# Patient Record
Sex: FEMALE | Race: WHITE | Employment: OTHER | URBAN - METROPOLITAN AREA
[De-identification: names, ages, dates, MRNs, and addresses within clinical notes are randomized per-mention and may not be internally consistent; named-entity substitution may affect disease eponyms.]

---

## 2017-07-19 ENCOUNTER — HOSPITAL ENCOUNTER (EMERGENCY)
Age: 27
Discharge: HOME | End: 2017-07-19
Attending: EMERGENCY MEDICINE
Payer: COMMERCIAL

## 2017-07-19 VITALS
SYSTOLIC BLOOD PRESSURE: 124 MMHG | BODY MASS INDEX: 19.76 KG/M2 | WEIGHT: 125.88 LBS | HEIGHT: 67 IN | DIASTOLIC BLOOD PRESSURE: 77 MMHG | HEART RATE: 66 BPM | TEMPERATURE: 97.7 F | OXYGEN SATURATION: 100 % | RESPIRATION RATE: 16 BRPM

## 2017-07-19 DIAGNOSIS — M43.6 TORTICOLLIS, ACUTE: Primary | ICD-10-CM

## 2017-07-19 LAB — HCG UR QL: NEGATIVE

## 2017-07-19 PROCEDURE — 96375 TX/PRO/DX INJ NEW DRUG ADDON: CPT

## 2017-07-19 PROCEDURE — 81025 URINE PREGNANCY TEST: CPT

## 2017-07-19 PROCEDURE — 96374 THER/PROPH/DIAG INJ IV PUSH: CPT

## 2017-07-19 PROCEDURE — 99283 EMERGENCY DEPT VISIT LOW MDM: CPT

## 2017-07-19 PROCEDURE — 74011250636 HC RX REV CODE- 250/636: Performed by: EMERGENCY MEDICINE

## 2017-07-19 RX ORDER — DIAZEPAM 10 MG/2ML
5 INJECTION INTRAMUSCULAR
Status: COMPLETED | OUTPATIENT
Start: 2017-07-19 | End: 2017-07-19

## 2017-07-19 RX ORDER — IBUPROFEN 600 MG/1
600 TABLET ORAL
Qty: 20 TAB | Refills: 0 | Status: SHIPPED | OUTPATIENT
Start: 2017-07-19 | End: 2020-02-24

## 2017-07-19 RX ORDER — KETOROLAC TROMETHAMINE 30 MG/ML
30 INJECTION, SOLUTION INTRAMUSCULAR; INTRAVENOUS
Status: COMPLETED | OUTPATIENT
Start: 2017-07-19 | End: 2017-07-19

## 2017-07-19 RX ORDER — METHOCARBAMOL 750 MG/1
750 TABLET, FILM COATED ORAL 4 TIMES DAILY
Qty: 30 TAB | Refills: 0 | Status: SHIPPED | OUTPATIENT
Start: 2017-07-19 | End: 2020-02-24

## 2017-07-19 RX ADMIN — KETOROLAC TROMETHAMINE 30 MG: 30 INJECTION, SOLUTION INTRAMUSCULAR at 11:36

## 2017-07-19 RX ADMIN — DIAZEPAM 2.5 MG: 5 INJECTION, SOLUTION INTRAMUSCULAR; INTRAVENOUS at 11:36

## 2017-07-19 NOTE — ED NOTES
Patient c/o left sided neck pain after turning her head while in her bed. She stated she felt fine yesterday. H/o thoracic disc herniation, and TMJ.

## 2017-07-19 NOTE — ED PROVIDER NOTES
HPI Comments: 32 y.o. 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 Pt reports she was getting out of bed this morning when she turned her head and had sudden onset left sided neck pain. Pt states she felt fine last night. Pt states she has hx of a herniated disc \"between her shoulder blades\" and TMJ. There are no other acute medical complaints voiced at this time. PCP: Zane Martinez MD    Note written by Hayes Artis, as dictated by Stella Mayer MD 11:19 AM     The history is provided by the patient. Past Medical History:   Diagnosis Date    Herniation of intervertebral disc of thoracolumbar region     TMJ condylar resorption        Past Surgical History:   Procedure Laterality Date    HX APPENDECTOMY      HX TONSIL AND ADENOIDECTOMY      HX WISDOM TEETH EXTRACTION           History reviewed. No pertinent family history. Social History     Social History    Marital status: SINGLE     Spouse name: N/A    Number of children: N/A    Years of education: N/A     Occupational History    Not on file. Social History Main Topics    Smoking status: Never Smoker    Smokeless tobacco: Never Used    Alcohol use No    Drug use: Not on file    Sexual activity: Not on file     Other Topics Concern    Not on file     Social History Narrative         ALLERGIES: Codeine    Review of Systems   Constitutional: Negative for chills and fever. HENT: Negative for ear pain and sore throat. Eyes: Negative for photophobia and pain. Respiratory: Negative for chest tightness and shortness of breath. Cardiovascular: Negative for chest pain and leg swelling. Gastrointestinal: Negative for abdominal pain, nausea and vomiting. Genitourinary: Negative for dysuria and flank pain. Musculoskeletal: Positive for neck pain (left side). Negative for back pain. Skin: Negative for rash and wound. Neurological: Negative for dizziness, light-headedness and headaches. All other systems reviewed and are negative. Vitals:    07/19/17 1104   BP: 129/82   Pulse: 66   Resp: 16   Temp: 97.7 °F (36.5 °C)   SpO2: 100%   Weight: 57.1 kg (125 lb 14.1 oz)   Height: 5' 7\" (1.702 m)            Physical Exam   Constitutional: She is oriented to person, place, and time. She appears well-developed and well-nourished. She appears distressed. HENT:   Head: Normocephalic and atraumatic. Eyes: Conjunctivae and EOM are normal.   Neck: Muscular tenderness present. Decreased range of motion present. Cardiovascular: Normal rate, regular rhythm and intact distal pulses. Pulmonary/Chest: Effort normal. No stridor. No respiratory distress. Musculoskeletal: She exhibits no edema or tenderness. Neurological: She is alert and oriented to person, place, and time. Skin: She is not diaphoretic. Psychiatric: She has a normal mood and affect. Nursing note and vitals reviewed. MDM  Number of Diagnoses or Management Options  Torticollis, acute:   Diagnosis management comments: Muscle spasm on left side of neck c/w acute torticollis - no trauma reported  No neuro deficits, give meds for symptoms and refer to her PCP for PT referral if she does not improve.     Patient Progress  Patient progress: improved    ED Course       Procedures

## 2017-07-19 NOTE — DISCHARGE INSTRUCTIONS
Torticollis: Care Instructions  Your Care Instructions  Torticollis is a severe tightness of the muscles on one side of the neck. The tight muscles can make the head turn to one side, lean to one side, or be pulled forward or backward. It is also called wryneck. Your doctor asked questions about your health and examined you. You may also have had X-rays or other tests. If your doctor thinks another medical problem is causing your tight neck muscles, you may need more tests. Torticollis usually gets better with home care. Your doctor may have you take medicine to relieve pain or relax your muscles. He or she may suggest exercise and physical therapy to help increase flexibility and relieve stress. Your doctor may also have you wear a special collar, called a cervical collar, for a day or two. The collar may help make your neck more comfortable. Follow-up care is a key part of your treatment and safety. Be sure to make and go to all appointments, and call your doctor if you are having problems. It's also a good idea to know your test results and keep a list of the medicines you take. How can you care for yourself at home? · Be safe with medicines. Read and follow all instructions on the label. ¨ If the doctor gave you a prescription medicine for pain, take it as prescribed. ¨ If you are not taking a prescription pain medicine, ask your doctor if you can take an over-the-counter medicine. · Try using a heating pad on a low or medium setting for 15 to 20 minutes every 2 or 3 hours. Try a warm shower in place of one session with the heating pad. · Try using an ice pack for 10 to 15 minutes every 2 to 3 hours. Put a thin cloth between the ice and your skin. · If your doctor recommends a cervical collar, wear it exactly as directed. When should you call for help? Call your doctor now or seek immediate medical care if:  · You have new or worse numbness in your arms, buttocks, or legs.   · You have new or worse weakness in your arms or legs. · Your neck pain gets worse. · You lose bladder or bowel control. Watch closely for changes in your health, and be sure to contact your doctor if:  · You do not get better as expected. Where can you learn more? Go to http://sherlyn-lei.info/. Enter B397 in the search box to learn more about \"Torticollis: Care Instructions. \"  Current as of: March 21, 2017  Content Version: 11.3  © 8255-6245 [employer]. Care instructions adapted under license by [employer] (which disclaims liability or warranty for this information). If you have questions about a medical condition or this instruction, always ask your healthcare professional. [address] any warranty or liability for your use of this information. We hope that we have addressed all of your medical concerns. The examination and treatment you received in the Emergency Department were for an emergent problem and were not intended as complete care. It is important that you follow up with your healthcare provider(s) for ongoing care. If your symptoms worsen or do not improve as expected, and you are unable to reach your usual health care provider(s), you should return to the Emergency Department. Today's healthcare is undergoing tremendous change, and patient satisfaction surveys are one of the many tools to assess the quality of medical care. You may receive a survey from the [employer] regarding your experience in the Emergency Department. I hope that your experience has been completely positive, particularly the medical care that I provided. As such, please participate in the survey; anything less than excellent does not meet my expectations or intentions. [facility] and [facility] participate in nationally recognized quality of care measures.   If your blood pressure is greater than 120/80, as reported below, we urge that you seek medical care to address the potential of high blood pressure, commonly known as hypertension. Hypertension can be hereditary or can be caused by certain medical conditions, pain, stress, or \"white coat syndrome. \"       Please make an appointment with your health care provider(s) for follow up of your Emergency Department visit. VITALS:   Patient Vitals for the past 8 hrs:   Temp Pulse Resp BP SpO2   07/19/17 1104 97.7 °F (36.5 °C) 66 16 129/82 100 %          Thank you for allowing us to provide you with medical care today. We realize that you have many choices for your emergency care needs. Please choose us in the future for any continued health care needs. Melani Dixon, [address] [place] [address].   Office: [phone]            Recent Results (from the past 24 hour(s))   HCG URINE, QL. - POC    Collection Time: 07/19/17 11:36 AM   Result Value Ref Range    Pregnancy test,urine (POC) NEGATIVE  NEG

## 2017-07-19 NOTE — ED NOTES
While administering Diazepam patient began to feel dizzy like she was going to pass out, 2.5mg IV of diazepam administered only.

## 2019-11-29 NOTE — PATIENT INSTRUCTIONS
Weeks 10 to 14 of Your Pregnancy: Care Instructions  Your Care Instructions    By weeks 10 to 14 of your pregnancy, the placenta has formed inside your uterus. It is possible to hear your baby's heartbeat with a special ultrasound device. Your baby's eyes can and do move. The arms and legs can bend. This is a good time to think about testing for birth defects. There are two types of tests: screening and diagnostic. Screening tests show the chance that a baby has a certain birth defect. They can't tell you for sure that your baby has a problem. Diagnostic tests show if a baby has a certain birth defect. It's your choice whether to have these tests. You and your partner can talk to your doctor or midwife about birth defects tests. Follow-up care is a key part of your treatment and safety. Be sure to make and go to all appointments, and call your doctor if you are having problems. It's also a good idea to know your test results and keep a list of the medicines you take. How can you care for yourself at home? Decide about tests  · You can have screening tests and diagnostic tests to check for birth defects. The decision to have a test for birth defects is personal. Think about your age, your chance of passing on a family disease, your need to know about any problems, and what you might do after you have the test results. ? Triple or quadruple (quad) blood tests. These screening tests can be done between 15 and 20 weeks of pregnancy. They check the amounts of three or four substances in your blood. The doctor looks at these test results, along with your age and other factors, to find out the chance that your baby may have certain problems. ? Amniocentesis. This diagnostic test is used to look for chromosomal problems in the baby's cells.  It can be done between 15 and 20 weeks of pregnancy, usually around week 16.  ? Nuchal translucency test. This test uses ultrasound to measure the thickness of the area at the back of the baby's neck. An increase in the thickness can be an early sign of Down syndrome. ? Chorionic villus sampling (CVS). This is a test that looks for certain genetic problems with your baby. The same genes that are in your baby are in the placenta. A small piece of the placenta is taken out and tested. This test is done when you are 10 to 13 weeks pregnant. Ease discomfort  · Slow down and take naps when you feel tired. · If your emotions swing, talk to someone. Crying, anxiety, and concentration problems are common. · If your gums bleed, try a softer toothbrush. If your gums are puffy and bleed a lot, see your dentist.  · If you feel dizzy:  ? Get up slowly after sitting or lying down. ? Drink plenty of fluids. ? Eat small snacks to keep your blood sugar stable. ? Put your head between your legs as though you were tying your shoelaces. ? Lie down with your legs higher than your head. Use pillows to prop up your feet. · If you have a headache:  ? Lie down. ? Ask your partner or a good friend for a neck massage. ? Try cool cloths over your forehead or across the back of your neck. ? Use acetaminophen (Tylenol) for pain relief. Do not use nonsteroidal anti-inflammatory drugs (NSAIDs), such as ibuprofen (Advil, Motrin) or naproxen (Aleve), unless your doctor says it is okay. · If you have a nosebleed, pinch your nose gently, and hold it for a short while. To prevent nosebleeds, try massaging a small dab of petroleum jelly, such as Vaseline, in your nostrils. · If your nose is stuffed up, try saline (saltwater) nose sprays. Do not use decongestant sprays. Care for your breasts  · Wear a bra that gives you good support. · Know that changes in your breasts are normal.  ? Your breasts may get larger and more tender. Tenderness usually gets better by 12 weeks. ? Your nipples may get darker and larger, and small bumps around your nipples may show more. ?  The veins in your chest and breasts may show more. · Don't worry about \"toughening'\" your nipples. Breastfeeding will naturally do this. Where can you learn more? Go to http://sherlyn-lei.info/. Enter D440 in the search box to learn more about \"Weeks 10 to 14 of Your Pregnancy: Care Instructions. \"  Current as of: May 29, 2019  Content Version: 12.2  © 2463-2236 [employer]. Care instructions adapted under license by [employer] (which disclaims liability or warranty for this information). If you have questions about a medical condition or this instruction, always ask your healthcare professional. [address] any warranty or liability for your use of this information.

## 2019-12-02 ENCOUNTER — OFFICE VISIT (OUTPATIENT)
Dept: OBGYN CLINIC | Age: 29
End: 2019-12-02

## 2019-12-02 VITALS — DIASTOLIC BLOOD PRESSURE: 82 MMHG | WEIGHT: 130 LBS | SYSTOLIC BLOOD PRESSURE: 120 MMHG

## 2019-12-02 DIAGNOSIS — O16.9 ESSENTIAL HYPERTENSION AFFECTING PREGNANCY, ANTEPARTUM: ICD-10-CM

## 2019-12-02 DIAGNOSIS — Z23 ENCOUNTER FOR IMMUNIZATION: ICD-10-CM

## 2019-12-02 DIAGNOSIS — Z34.80 SUPERVISION OF OTHER NORMAL PREGNANCY, ANTEPARTUM: Primary | ICD-10-CM

## 2019-12-02 PROBLEM — Z34.90 PRENATAL CARE: Status: ACTIVE | Noted: 2019-12-02

## 2019-12-02 LAB
ANTIBODY SCREEN, EXTERNAL: NEGATIVE
CHLAMYDIA, EXTERNAL: NEGATIVE
HBSAG, EXTERNAL: NEGATIVE
HCT, EXTERNAL: 41.1
HGB, EXTERNAL: 14.3
HIV, EXTERNAL: NORMAL
N. GONORRHEA, EXTERNAL: NEGATIVE
NIPT, EXTERNAL: NORMAL
PAP SMEAR, EXTERNAL: NORMAL
RUBELLA, EXTERNAL: 7.68
T. PALLIDUM, EXTERNAL: NORMAL
TYPE, ABO & RH, EXTERNAL: NORMAL
URINALYSIS, EXTERNAL: NEGATIVE

## 2019-12-02 RX ORDER — HYDROCHLOROTHIAZIDE 12.5 MG/1
TABLET ORAL
Refills: 7 | COMMUNITY
Start: 2019-10-22 | End: 2020-02-24

## 2019-12-02 RX ORDER — METHYLDOPA 250 MG/1
250 TABLET, FILM COATED ORAL 3 TIMES DAILY
Qty: 90 TAB | Refills: 3 | Status: SHIPPED | OUTPATIENT
Start: 2019-12-02 | End: 2020-03-10 | Stop reason: SDUPTHER

## 2019-12-02 NOTE — LETTER
12/2/2019 10:52 AM 
 
 
Ms. Jeni Hancock [address] To Whom it may concern; Jeni Hancock is under my care. She was seen in our office today for her initial pregnancy appointment. She is hereby prohibited from traveling until after 14 weeks gestation due to the risk of miscarriage. Please contact my office should you have any questions. Sincerely, Nely Baker MD

## 2019-12-03 LAB
ALBUMIN SERPL-MCNC: 4.7 G/DL (ref 3.5–5.5)
ALBUMIN/GLOB SERPL: 2.2 {RATIO} (ref 1.2–2.2)
ALP SERPL-CCNC: 58 IU/L (ref 39–117)
ALT SERPL-CCNC: 14 IU/L (ref 0–32)
AST SERPL-CCNC: 20 IU/L (ref 0–40)
BILIRUB SERPL-MCNC: 0.3 MG/DL (ref 0–1.2)
BUN SERPL-MCNC: 7 MG/DL (ref 6–20)
BUN/CREAT SERPL: 12 (ref 9–23)
CALCIUM SERPL-MCNC: 9.7 MG/DL (ref 8.7–10.2)
CHLORIDE SERPL-SCNC: 97 MMOL/L (ref 96–106)
CO2 SERPL-SCNC: 22 MMOL/L (ref 20–29)
CREAT SERPL-MCNC: 0.6 MG/DL (ref 0.57–1)
GLOBULIN SER CALC-MCNC: 2.1 G/DL (ref 1.5–4.5)
GLUCOSE SERPL-MCNC: 80 MG/DL (ref 65–99)
POTASSIUM SERPL-SCNC: 3.8 MMOL/L (ref 3.5–5.2)
PROT SERPL-MCNC: 6.8 G/DL (ref 6–8.5)
SODIUM SERPL-SCNC: 137 MMOL/L (ref 134–144)

## 2019-12-03 NOTE — PROGRESS NOTES
Normal, reviewed  Add PIH labs to PL (hgb,plat,cr, ast, alt, ur: prot 24 hr total or ratio) w date  Can review w pt at [employer]

## 2019-12-04 LAB
ABO GROUP BLD: NORMAL
BACTERIA UR CULT: NO GROWTH
BACTERIA UR CULT: NORMAL
BLD GP AB SCN SERPL QL: NEGATIVE
ERYTHROCYTE [DISTWIDTH] IN BLOOD BY AUTOMATED COUNT: 12.6 % (ref 12.3–15.4)
HBV SURFACE AG SERPL QL IA: NEGATIVE
HCT VFR BLD AUTO: 41.1 % (ref 34–46.6)
HGB BLD-MCNC: 14.3 G/DL (ref 11.1–15.9)
HIV 1+2 AB+HIV1 P24 AG SERPL QL IA: NON REACTIVE
MCH RBC QN AUTO: 30.5 PG (ref 26.6–33)
MCHC RBC AUTO-ENTMCNC: 34.8 G/DL (ref 31.5–35.7)
MCV RBC AUTO: 88 FL (ref 79–97)
MORPHOLOGY BLD-IMP: NORMAL
PLATELET # BLD AUTO: NORMAL X10E3/UL
RBC # BLD AUTO: 4.69 X10E6/UL (ref 3.77–5.28)
RH BLD: POSITIVE
RUBV IGG SERPL IA-ACNC: 7.68 INDEX
TREPONEMA PALLIDUM IGG+IGM AB [PRESENCE] IN SERUM OR PLASMA BY IMMUNOASSAY: NON REACTIVE
WBC # BLD AUTO: 9.2 X10E3/UL (ref 3.4–10.8)

## 2019-12-05 LAB
C TRACH RRNA CVX QL NAA+PROBE: NEGATIVE
CYTOLOGIST CVX/VAG CYTO: NORMAL
CYTOLOGY CVX/VAG DOC CYTO: NORMAL
CYTOLOGY CVX/VAG DOC THIN PREP: NORMAL
CYTOLOGY HISTORY:: NORMAL
DX ICD CODE: NORMAL
HPV I/H RISK 4 DNA CVX QL PROBE+SIG AMP: NEGATIVE
Lab: NORMAL
N GONORRHOEA RRNA CVX QL NAA+PROBE: NEGATIVE
OTHER STN SPEC: NORMAL
STAT OF ADQ CVX/VAG CYTO-IMP: NORMAL

## 2019-12-09 LAB — CYSTIC FIBROSIS, EXTERNAL: NEGATIVE

## 2019-12-10 ENCOUNTER — TELEPHONE (OUTPATIENT)
Dept: OBGYN CLINIC | Age: 29
End: 2019-12-10

## 2019-12-10 NOTE — TELEPHONE ENCOUNTER
Call received 540am    30year old patient last seen in the office 12/2/19    Patient denies vaginal bleeding and cramping. Patient calling with questions. 1) patient wondering about getting the d-tap . Patient advised that she will get it at 28 week chad.    2) patient wondering about stopping the       hydroCHLOROthiazide (HYDRODIURIL) 12.5 mg tablet    Or taking it with the methyldopa (ALDOMET) 250 mg tablet     This nurse advised per your notes that she is to change to taking the aldomet and stop the hydrodiuril  Please confirm    3) Patient states she is scheduled for jury duty every day for 6-8 hours starting on 12/20 -1/3/2020    Patient is wondering if she might have a letter to excuse her due to the pregnancy, feeling tired, fatigue, using her inhaler      Please advised regarding questions 2 and 3

## 2019-12-10 NOTE — TELEPHONE ENCOUNTER
1: rec tdap in third trimester, but family members who will be around baby can get it at any time    2: we discussed stopping hctz at EOB visit, yes stop it  I sent aldomet tid to replace it, discussed it with her at visit, yes start it    3: I am not sure why she can't perform jury duty while pregnant (unless she is near her due date or on bed rest/limited activity). Those would be things I could write a letter for. .  If there is an inhaler/medication issue, that may need to be addressed by PCP, if she can't have access to inhaler in court. How often is he using her inhaler? If it is every day/more frequently, then I recommend seeing PCP, to see if she needs maintenance medication /rather than a rescue inhaler. It is important to keep asthma under control in pregnancy.

## 2019-12-11 NOTE — TELEPHONE ENCOUNTER
Patient advised of MD recommendations and verbalized understanding. See result notes. Patient advised of MD reviewed labs and recommendations. Patient verbalized understanding.

## 2019-12-11 NOTE — PROGRESS NOTES
Normal results, add to prenatal records and PL  Notify pt  Low risk NIPS screening  Xx if wants to know  Confirm has 20 wk us scheduled.

## 2019-12-11 NOTE — PROGRESS NOTES
Patient advised of MD reviewed labs and recommendations and verbalized understanding. Patient was given the gender as requested.

## 2019-12-13 NOTE — PROGRESS NOTES
After obtaining consent, and per orders of Dr. Lauryn Crain, injection of the Influenza Vaccine given in left deltoid by Minoo Sullivan LPN. Patient instructed to remain in clinic for 20 minutes afterwards, and to report any adverse reaction to me immediately.

## 2019-12-30 ENCOUNTER — ROUTINE PRENATAL (OUTPATIENT)
Dept: OBGYN CLINIC | Age: 29
End: 2019-12-30

## 2019-12-30 VITALS
DIASTOLIC BLOOD PRESSURE: 82 MMHG | BODY MASS INDEX: 21.03 KG/M2 | WEIGHT: 134 LBS | HEIGHT: 67 IN | SYSTOLIC BLOOD PRESSURE: 122 MMHG

## 2019-12-30 DIAGNOSIS — Z3A.14 14 WEEKS GESTATION OF PREGNANCY: ICD-10-CM

## 2019-12-30 DIAGNOSIS — Z34.90 PRENATAL CARE, ANTEPARTUM: ICD-10-CM

## 2019-12-30 DIAGNOSIS — O16.9 ESSENTIAL HYPERTENSION AFFECTING PREGNANCY, ANTEPARTUM: Primary | ICD-10-CM

## 2019-12-30 NOTE — PROGRESS NOTES
[facility]  [email or web address]  [phone]    Dudley Reddy MD, FACOG     Follow-up OB visit    Chief Complaint   Patient presents with    Routine Prenatal Visit       Vitals:    19 1007   BP: 122/82   Weight: 134 lb (60.8 kg)   Height: 5' 7\" (1.702 m)       Patient Active Problem List    Diagnosis Date Noted    Prenatal care 2019        The patient reports the following concerns: none    Prenatal Visit    Vitals:    19 1007   BP: 122/82   Weight: 134 lb (60.8 kg)   Height: 5' 7\" (1.702 m)     See PN flowsheet for exam      30 y.o.  14w1d   Encounter Diagnosis   Name Primary?     Essential hypertension affecting pregnancy, antepartum Yes        [] SAB/bleeding precautions reviewed   [] PTL/PPROM precautions reviewed   [] Labor precautions reviewed   [] Fetal kick counts discussed   [] Labs reviewed with patient   [] Boston Garcia precautions reviewed   [] Consent reviewed   [] Handouts given to pt   [] Glucola handout    [] GBS/labor/Magic Hour handout   []    [] We reviewed CDC recommendations for Tdap for patient and close contacts and RBA of receiving in pregnancy, advised obtaining in third trimester   [] Reviewed healthy nutrition in pregnancy and good exercise practices   [] We disc safer medications in pregnancy and referred patient to [facility] NUBIA resources   [] We reviewed CDC recommendations for flu vaccine and RBA of receiving in pregnancy   []    []    []           Orders Placed This Encounter   Mckay Davila MD

## 2019-12-30 NOTE — PATIENT INSTRUCTIONS

## 2019-12-31 LAB
CREAT 24H UR-MRATE: 955 MG/24 HR (ref 800–1800)
CREAT UR-MCNC: 34.1 MG/DL
PROT 24H UR-MRATE: 168 MG/24 HR (ref 30–150)
PROT UR-MCNC: 6 MG/DL

## 2020-01-24 NOTE — PATIENT INSTRUCTIONS

## 2020-01-27 ENCOUNTER — ROUTINE PRENATAL (OUTPATIENT)
Dept: OBGYN CLINIC | Age: 30
End: 2020-01-27

## 2020-01-27 VITALS
WEIGHT: 137 LBS | DIASTOLIC BLOOD PRESSURE: 62 MMHG | HEIGHT: 67 IN | BODY MASS INDEX: 21.5 KG/M2 | SYSTOLIC BLOOD PRESSURE: 118 MMHG

## 2020-01-27 DIAGNOSIS — Z34.90 PRENATAL CARE, ANTEPARTUM: Primary | ICD-10-CM

## 2020-01-27 DIAGNOSIS — Z3A.18 18 WEEKS GESTATION OF PREGNANCY: ICD-10-CM

## 2020-01-27 DIAGNOSIS — O10.919 ESSENTIAL HYPERTENSION IN PREGNANCY: ICD-10-CM

## 2020-01-27 LAB — AFP, MATERNAL, EXTERNAL: NORMAL

## 2020-01-27 NOTE — PROGRESS NOTES
[facility]  [email or web address]  [phone]    Devonte Anthony MD, FACOG     Follow-up OB visit    Chief Complaint   Patient presents with    Routine Prenatal Visit       Vitals:    20 0932   BP: 118/62   Weight: 137 lb (62.1 kg)   Height: 5' 7\" (1.702 m)       Patient Active Problem List    Diagnosis Date Noted    Essential hypertension in pregnancy 2020    Prenatal care 2019        The patient reports the following concerns: none, MSAFP today    Prenatal Visit    Vitals:    20 0932   BP: 118/62   Weight: 137 lb (62.1 kg)   Height: 5' 7\" (1.702 m)     See PN flowsheet for exam      30 y.o.  18w1d   Encounter Diagnoses   Name Primary?  Prenatal care, antepartum Yes    Essential hypertension in pregnancy     18 weeks gestation of pregnancy         [] SAB/bleeding precautions reviewed   [] PTL/PPROM precautions reviewed   [] Labor precautions reviewed   [] Fetal kick counts discussed   [] Labs reviewed with patient   [] Radha Mary precautions reviewed   [] Consent reviewed   [] Handouts given to pt   [] Glucola handout    [] GBS/labor/Magic Hour handout   []    [] We reviewed CDC recommendations for Tdap for patient and close contacts and RBA of receiving in pregnancy, advised obtaining in third trimester   [] Reviewed healthy nutrition in pregnancy and good exercise practices   [] We disc safer medications in pregnancy and referred patient to [facility]   [] We reviewed CDC recommendations for flu vaccine and RBA of receiving in pregnancy   []    []    []       Follow-up and Dispositions    · Return in about 4 weeks (around 2020) for Follow up OB visit.          Orders Placed This Encounter    AFP, MATERNAL SCREEN    CBC W/O DIFF       Devonte Anthony MD

## 2020-01-29 LAB
AFP ADJ MOM SERPL: 1.07
AFP INTERP SERPL-IMP: NORMAL
AFP INTERP SERPL-IMP: NORMAL
AFP SERPL-MCNC: 49 NG/ML
AGE AT DELIVERY: 32.3 YR
COMMENT, 018013: NORMAL
ERYTHROCYTE [DISTWIDTH] IN BLOOD BY AUTOMATED COUNT: 12.3 % (ref 11.7–15.4)
GA METHOD: NORMAL
GA: 18 WEEKS
HCT VFR BLD AUTO: 37.1 % (ref 34–46.6)
HGB BLD-MCNC: 12.5 G/DL (ref 11.1–15.9)
IDDM PATIENT QL: NO
MCH RBC QN AUTO: 30 PG (ref 26.6–33)
MCHC RBC AUTO-ENTMCNC: 33.7 G/DL (ref 31.5–35.7)
MCV RBC AUTO: 89 FL (ref 79–97)
MORPHOLOGY BLD-IMP: NORMAL
MULTIPLE PREGNANCY: NO
NEURAL TUBE DEFECT RISK FETUS: 9862 %
PLATELET # BLD AUTO: NORMAL X10E3/UL
RBC # BLD AUTO: 4.17 X10E6/UL (ref 3.77–5.28)
RESULTS, 017004: NORMAL
WBC # BLD AUTO: 10.5 X10E3/UL (ref 3.4–10.8)

## 2020-01-29 NOTE — PROGRESS NOTES
Normal results, add to prenatal records. We can review in detail with patient at next visit.   Update PNL: hgb/plat/afp

## 2020-02-10 ENCOUNTER — HOSPITAL ENCOUNTER (OUTPATIENT)
Dept: PERINATAL CARE | Age: 30
Discharge: HOME | End: 2020-02-10
Attending: OBSTETRICS & GYNECOLOGY
Payer: COMMERCIAL

## 2020-02-10 PROCEDURE — 76805 OB US >/= 14 WKS SNGL FETUS: CPT | Performed by: OBSTETRICS & GYNECOLOGY

## 2020-02-24 ENCOUNTER — ROUTINE PRENATAL (OUTPATIENT)
Dept: OBGYN CLINIC | Age: 30
End: 2020-02-24

## 2020-02-24 VITALS
DIASTOLIC BLOOD PRESSURE: 58 MMHG | HEIGHT: 67 IN | SYSTOLIC BLOOD PRESSURE: 106 MMHG | WEIGHT: 143 LBS | BODY MASS INDEX: 22.44 KG/M2

## 2020-02-24 DIAGNOSIS — Z34.00 PRENATAL CARE OF PRIMIGRAVIDA, ANTEPARTUM: ICD-10-CM

## 2020-02-24 DIAGNOSIS — O10.919 ESSENTIAL HYPERTENSION IN PREGNANCY: Primary | ICD-10-CM

## 2020-02-24 DIAGNOSIS — Z34.90 PRENATAL CARE, ANTEPARTUM: ICD-10-CM

## 2020-02-24 DIAGNOSIS — Z3A.22 22 WEEKS GESTATION OF PREGNANCY: ICD-10-CM

## 2020-02-24 NOTE — Clinical Note
Add tsh to gluocla visit, does she have MFM appt set up for 3rd tri growth for [facility]

## 2020-02-24 NOTE — PROGRESS NOTES
_ [facility]  [email or web address]  [phone]    Humble Alegria MD, FACOG     Follow-up OB visit    Chief Complaint   Patient presents with    Routine Prenatal Visit       Vitals:    20 0903   BP: 106/58   Weight: 143 lb (64.9 kg)   Height: 5' 7\" (1.702 m)       Patient Active Problem List    Diagnosis Date Noted    Essential hypertension in pregnancy 2020    Prenatal care 2019        The patient reports the following concerns: doing well, C/O of intermittent palpitations  GERD:       Prenatal Visit    Vitals:    20 0903   BP: 106/58   Weight: 143 lb (64.9 kg)   Height: 5' 7\" (1.702 m)     See PN flowsheet for exam  Chest  · Respiratory Effort: breathing normal        Auscultation: normal breath sounds, clear bilaterally    Cardiovascular  · Heart:  · Auscultation: regular rate and rhythm without murmur, normal S1, S2    Ext no c/te    26 y.o.  22w1d   Encounter Diagnoses   Name Primary?     Prenatal care of primigravida, antepartum Yes    Prenatal care, antepartum      Disc labs w glucola, and TSH; defer labs to that draw or sooner prn  Disc safer meds for GERD     [] SAB/bleeding precautions reviewed   [] PTL/PPROM precautions reviewed   [] Labor precautions reviewed   [] Fetal kick counts discussed   [] Labs reviewed with patient   [] Hilda Barges precautions reviewed   [] Consent reviewed   [] Handouts given to pt   [] Glucola handout    [] GBS/labor/Magic Hour handout   []    [] We reviewed CDC recommendations for Tdap for patient and close contacts and RBA of receiving in pregnancy, advised obtaining in third trimester   [] Reviewed healthy nutrition in pregnancy and good exercise practices   [] We disc safer medications in pregnancy and referred patient to [facility] NUBIA resources   [] We reviewed CDC recommendations for flu vaccine and RBA of receiving in pregnancy   []    []    []       Follow-up and Dispositions    · Return in about 4 weeks (around 3/23/2020) for Follow up OB visit. No orders of the defined types were placed in this encounter.       Essie Banuelos MD

## 2020-02-24 NOTE — PATIENT INSTRUCTIONS
Weeks 22 to 26 of Your Pregnancy: Care Instructions  Your Care Instructions    As you enter your 7th month of pregnancy at week 26, your baby's lungs are growing stronger and getting ready to breathe. You may notice that your baby responds to the sound of your or your partner's voice. You may also notice that your baby does less turning and twisting and more squirming or jerking. Jerking often means that your baby has the hiccups. Hiccups are perfectly normal and are only temporary. You may want to think about attending a childbirth preparation class. This is also a good time to start thinking about whether you want to have pain medicine during labor. Most pregnant women are tested for gestational diabetes between weeks 25 and 28. Gestational diabetes occurs when your blood sugar level gets too high when you're pregnant. The test is important, because you can have gestational diabetes and not know it. But the condition can cause problems for your baby. Follow-up care is a key part of your treatment and safety. Be sure to make and go to all appointments, and call your doctor if you are having problems. It's also a good idea to know your test results and keep a list of the medicines you take. How can you care for yourself at home? Ease discomfort from your baby's kicking  · Change your position. Sometimes this will cause your baby to change position too. · Take a deep breath while you raise your arm over your head. Then breathe out while you drop your arm. Do Kegel exercises to prevent urine from leaking  · You can do Kegel exercises while you stand or sit. ? Squeeze the same muscles you would use to stop your urine. Your belly and thighs should not move. ? Hold the squeeze for 3 seconds, and then relax for 3 seconds. ? Start with 3 seconds. Then add 1 second each week until you are able to squeeze for 10 seconds. ? Repeat the exercise 10 to 15 times for each session.  Do three or more sessions each day.  Ease or reduce swelling in your feet, ankles, hands, and fingers  · If your fingers are puffy, take off your rings. · Do not eat high-salt foods, such as potato chips. · Prop up your feet on a stool or couch as much as possible. Sleep with pillows under your feet. · Do not stand for long periods of time or wear tight shoes. · Wear support stockings. Where can you learn more? Go to http://sherlyn-lei.info/. Enter G264 in the search box to learn more about \"Weeks 22 to 26 of Your Pregnancy: Care Instructions. \"  Current as of: May 29, 2019  Content Version: 12.2  © 8817-9668 [employer]. Care instructions adapted under license by [employer] (which disclaims liability or warranty for this information). If you have questions about a medical condition or this instruction, always ask your healthcare professional. [address] any warranty or liability for your use of this information.

## 2020-03-10 ENCOUNTER — TELEPHONE (OUTPATIENT)
Dept: OBGYN CLINIC | Age: 30
End: 2020-03-10

## 2020-03-10 RX ORDER — METHYLDOPA 250 MG/1
250 TABLET, FILM COATED ORAL 3 TIMES DAILY
Qty: 90 TAB | Refills: 3 | Status: SHIPPED | OUTPATIENT
Start: 2020-03-10 | End: 2020-07-16

## 2020-03-10 RX ORDER — METHYLDOPA 250 MG/1
250 TABLET, FILM COATED ORAL 3 TIMES DAILY
Qty: 90 TAB | Refills: 3 | Status: SHIPPED | OUTPATIENT
Start: 2020-03-10 | End: 2020-03-10

## 2020-03-10 NOTE — TELEPHONE ENCOUNTER
This nurse called the patient back. Patient would like the prescription resent to [address] Prescription resent as per Md order to patient requested pharmacy. Patient verbalized understanding.

## 2020-03-10 NOTE — TELEPHONE ENCOUNTER
[facility]   Phone Number: [phone]             [address] [place] [address] Street, thank you. I have been going through different [facility] to refill this medication each month.  The issue has been the lack of availability of the meds. Each month, I've had to call in ahead of time to ensure that I'll at least have my next month's supply. This month has been different, as I called to refill on Saturday, March 7th and they have been telling me each day that it will be in. I was able to get two days worth of medication today and they again, told me the medication will be in tomorrow, with no guarantee. The pharmacists tell me each month that this medication has to be called in from the supplier so I wasn't sure if calling this medication to another location was an option or if Dr. Tila Leblanc had another recommendation.     Previous Messages         Please advise how to proceed

## 2020-03-10 NOTE — TELEPHONE ENCOUNTER
I am not sure about the supply, but we can send it to an alternative pharmacy if needed. Or she can do 3mo supply through mail order.

## 2020-03-24 LAB
ANNOTATION COMMENT IMP: NORMAL
ANNOTATION COMMENT IMP: NORMAL
CARRIER DETECTION RATE, 450111: NORMAL
CFTR MUT ANL BLD/T: NORMAL
CLINICAL DATA, 450005: NORMAL
ELECTRONICALLY SIGNED BY, 450014: NORMAL
ETHNIC BACKGROUND STATED: NORMAL
GEN KNWLDGE REF ID: NORMAL
GENE DIS ANL CARRIER INTERP-IMP: NORMAL
GENETIC COUNSELOR, 450001: NORMAL
REF LAB TEST METHOD: NORMAL
SMN1 GENE MUT ANL BLD/T: NORMAL
SMN1 GENE MUT ANL BLD/T: NORMAL
SPECIMEN SOURCE: NORMAL
SPECIMEN STATUS REPORT, ROLRST: NORMAL
SPECIMEN(S) RECEIVED, 450004: NORMAL
TEST PERFORMANCE INFO SPEC: NORMAL

## 2020-03-25 NOTE — PROGRESS NOTES
Normal results, add to prenatal records. We can review in detail with patient at next visit.   Update cf/sma

## 2020-04-03 ENCOUNTER — HOSPITAL ENCOUNTER (EMERGENCY)
Age: 30
Discharge: HOME | End: 2020-04-03
Attending: STUDENT IN AN ORGANIZED HEALTH CARE EDUCATION/TRAINING PROGRAM | Admitting: STUDENT IN AN ORGANIZED HEALTH CARE EDUCATION/TRAINING PROGRAM
Payer: COMMERCIAL

## 2020-04-03 VITALS
RESPIRATION RATE: 18 BRPM | WEIGHT: 143 LBS | SYSTOLIC BLOOD PRESSURE: 112 MMHG | HEART RATE: 77 BPM | TEMPERATURE: 97.5 F | OXYGEN SATURATION: 100 % | BODY MASS INDEX: 22.44 KG/M2 | DIASTOLIC BLOOD PRESSURE: 64 MMHG | HEIGHT: 67 IN

## 2020-04-03 DIAGNOSIS — R55 SYNCOPE AND COLLAPSE: Primary | ICD-10-CM

## 2020-04-03 LAB
ALBUMIN SERPL-MCNC: 2.6 G/DL (ref 3.5–5)
ALBUMIN/GLOB SERPL: 0.8 {RATIO} (ref 1.1–2.2)
ALP SERPL-CCNC: 72 U/L (ref 45–117)
ALT SERPL-CCNC: 13 U/L (ref 12–78)
ANION GAP SERPL CALC-SCNC: 7 MMOL/L (ref 5–15)
APPEARANCE UR: CLEAR
AST SERPL-CCNC: 13 U/L (ref 15–37)
BASOPHILS # BLD: 0 K/UL (ref 0–0.1)
BASOPHILS NFR BLD: 0 % (ref 0–1)
BILIRUB SERPL-MCNC: 0.3 MG/DL (ref 0.2–1)
BILIRUB UR QL: NEGATIVE
BUN SERPL-MCNC: 5 MG/DL (ref 6–20)
BUN/CREAT SERPL: 10 (ref 12–20)
CALCIUM SERPL-MCNC: 8.7 MG/DL (ref 8.5–10.1)
CHLORIDE SERPL-SCNC: 108 MMOL/L (ref 97–108)
CO2 SERPL-SCNC: 23 MMOL/L (ref 21–32)
COLOR UR: NORMAL
COMMENT, HOLDF: NORMAL
CREAT SERPL-MCNC: 0.52 MG/DL (ref 0.55–1.02)
DIFFERENTIAL METHOD BLD: ABNORMAL
EOSINOPHIL # BLD: 0.3 K/UL (ref 0–0.4)
EOSINOPHIL NFR BLD: 3 % (ref 0–7)
ERYTHROCYTE [DISTWIDTH] IN BLOOD BY AUTOMATED COUNT: 12.7 % (ref 11.5–14.5)
GLOBULIN SER CALC-MCNC: 3.4 G/DL (ref 2–4)
GLUCOSE SERPL-MCNC: 103 MG/DL (ref 65–100)
GLUCOSE UR STRIP.AUTO-MCNC: NEGATIVE MG/DL
HCT VFR BLD AUTO: 32.1 % (ref 35–47)
HGB BLD-MCNC: 11 G/DL (ref 11.5–16)
HGB UR QL STRIP: NEGATIVE
IMM GRANULOCYTES # BLD AUTO: 0.1 K/UL (ref 0–0.04)
IMM GRANULOCYTES NFR BLD AUTO: 1 % (ref 0–0.5)
KETONES UR QL STRIP.AUTO: NEGATIVE MG/DL
LEUKOCYTE ESTERASE UR QL STRIP.AUTO: NEGATIVE
LYMPHOCYTES # BLD: 1.2 K/UL (ref 0.8–3.5)
LYMPHOCYTES NFR BLD: 12 % (ref 12–49)
MCH RBC QN AUTO: 29.9 PG (ref 26–34)
MCHC RBC AUTO-ENTMCNC: 34.3 G/DL (ref 30–36.5)
MCV RBC AUTO: 87.2 FL (ref 80–99)
MONOCYTES # BLD: 0.9 K/UL (ref 0–1)
MONOCYTES NFR BLD: 9 % (ref 5–13)
NEUTS SEG # BLD: 7.5 K/UL (ref 1.8–8)
NEUTS SEG NFR BLD: 75 % (ref 32–75)
NITRITE UR QL STRIP.AUTO: NEGATIVE
NRBC # BLD: 0 K/UL (ref 0–0.01)
NRBC BLD-RTO: 0 PER 100 WBC
PH UR STRIP: 6.5 [PH] (ref 5–8)
PLATELET # BLD AUTO: ABNORMAL K/UL (ref 150–400)
POTASSIUM SERPL-SCNC: 3.4 MMOL/L (ref 3.5–5.1)
PROT SERPL-MCNC: 6 G/DL (ref 6.4–8.2)
PROT UR STRIP-MCNC: NEGATIVE MG/DL
RBC # BLD AUTO: 3.68 M/UL (ref 3.8–5.2)
RBC MORPH BLD: ABNORMAL
SAMPLES BEING HELD,HOLD: NORMAL
SODIUM SERPL-SCNC: 138 MMOL/L (ref 136–145)
SP GR UR REFRACTOMETRY: <1.005 (ref 1–1.03)
UROBILINOGEN UR QL STRIP.AUTO: 0.2 EU/DL (ref 0.2–1)
WBC # BLD AUTO: 10 K/UL (ref 3.6–11)

## 2020-04-03 PROCEDURE — 80053 COMPREHEN METABOLIC PANEL: CPT

## 2020-04-03 PROCEDURE — 99284 EMERGENCY DEPT VISIT MOD MDM: CPT

## 2020-04-03 PROCEDURE — 93005 ELECTROCARDIOGRAM TRACING: CPT

## 2020-04-03 PROCEDURE — 81003 URINALYSIS AUTO W/O SCOPE: CPT

## 2020-04-03 PROCEDURE — 85025 COMPLETE CBC W/AUTO DIFF WBC: CPT

## 2020-04-03 PROCEDURE — 36415 COLL VENOUS BLD VENIPUNCTURE: CPT

## 2020-04-03 RX ORDER — ALBUTEROL SULFATE 90 UG/1
2 AEROSOL, METERED RESPIRATORY (INHALATION)
COMMUNITY
End: 2021-02-11

## 2020-04-03 NOTE — ED TRIAGE NOTES
Patient is 28 weeks pregnant, taking BP medication    Patient was not feeling well this morning, needed to go to restroom,  went to check on her in which he witnessed her have a seizure and pass out    Patient had a seizure in college

## 2020-04-03 NOTE — DISCHARGE INSTRUCTIONS
Patient Education        Fainting: Care Instructions  Your Care Instructions    When you faint, or pass out, you lose consciousness for a short time. A brief drop in blood flow to the brain often causes it. When you fall or lie down, more blood flows to your brain and you regain consciousness. Emotional stress, pain, or overheating--especially if you have been standing--can make you faint. In these cases, fainting is usually not serious. But fainting can be a sign of a more serious problem. Your doctor may want you to have more tests to rule out other causes. The treatment you need depends on the reason why you fainted. The doctor has checked you carefully, but problems can develop later. If you notice any problems or new symptoms, get medical treatment right away. Follow-up care is a key part of your treatment and safety. Be sure to make and go to all appointments, and call your doctor if you are having problems. It's also a good idea to know your test results and keep a list of the medicines you take. How can you care for yourself at home? · Drink plenty of fluids to prevent dehydration. If you have kidney, heart, or liver disease and have to limit fluids, talk with your doctor before you increase your fluid intake. When should you call for help? Call 911 anytime you think you may need emergency care. For example, call if:    · You have symptoms of a heart problem. These may include:  ? Chest pain or pressure. ? Severe trouble breathing. ? A fast or irregular heartbeat. ? Lightheadedness or sudden weakness. ? Coughing up pink, foamy mucus. ? Passing out. After you call  911, the  may tell you to chew 1 adult-strength or 2 to 4 low-dose aspirin. Wait for an ambulance. Do not try to drive yourself.     · You have symptoms of a stroke. These may include:  ? Sudden numbness, tingling, weakness, or loss of movement in your face, arm, or leg, especially on only one side of your body.   ? Sudden vision changes. ? Sudden trouble speaking. ? Sudden confusion or trouble understanding simple statements. ? Sudden problems with walking or balance. ? A sudden, severe headache that is different from past headaches.     · You passed out (lost consciousness) again.    Watch closely for changes in your health, and be sure to contact your doctor if:    · You do not get better as expected. Where can you learn more? Go to http://sherlyn-lei.info/  Enter A848 in the search box to learn more about \"Fainting: Care Instructions. \"  Current as of: June 26, 2019Content Version: 12.4  © 3410-5469 [employer], Incorporated. Care instructions adapted under license by [employer] (which disclaims liability or warranty for this information). If you have questions about a medical condition or this instruction, always ask your healthcare professional. [address] any warranty or liability for your use of this information.

## 2020-04-06 LAB
ATRIAL RATE: 66 BPM
CALCULATED P AXIS, ECG09: 24 DEGREES
CALCULATED R AXIS, ECG10: 28 DEGREES
CALCULATED T AXIS, ECG11: 40 DEGREES
DIAGNOSIS, 93000: NORMAL
P-R INTERVAL, ECG05: 114 MS
Q-T INTERVAL, ECG07: 380 MS
QRS DURATION, ECG06: 92 MS
QTC CALCULATION (BEZET), ECG08: 398 MS
VENTRICULAR RATE, ECG03: 66 BPM

## 2020-04-06 NOTE — PATIENT INSTRUCTIONS
Weeks 26 to 30 of Your Pregnancy: Care Instructions  Your Care Instructions    You are now in your last trimester of pregnancy. Your baby is growing rapidly. And you'll probably feel your baby moving around more often. Your doctor may ask you to count your baby's kicks. Your back may ache as your body gets used to your baby's size and length. If you haven't already had the Tdap shot during this pregnancy, talk to your doctor about getting it. It will help protect your  against pertussis infection. During this time, it's important to take care of yourself and pay attention to what your body needs. If you feel sexual, explore ways to be close with your partner that match your comfort and desire. Use the tips provided in this care sheet to find ways to be sexual in your own way. Follow-up care is a key part of your treatment and safety. Be sure to make and go to all appointments, and call your doctor if you are having problems. It's also a good idea to know your test results and keep a list of the medicines you take. How can you care for yourself at home? Take it easy at work  · Take frequent breaks. If possible, stop working when you are tired, and rest during your lunch hour. · Take bathroom breaks every 2 hours. · Change positions often. If you sit for long periods, stand up and walk around. · When you stand for a long time, keep one foot on a low stool with your knee bent. After standing a lot, sit with your feet up. · Avoid fumes, chemicals, and tobacco smoke. Be sexual in your own way  · Having sex during pregnancy is okay, unless your doctor tells you not to. · You may be very interested in sex, or you may have no interest at all. · Your growing belly can make it hard to find a good position during intercourse. [place] and explore. · You may get cramps in your uterus when your partner touches your breasts.   · A back rub may relieve the backache or cramps that sometimes follow orgasm. Learn about  labor  · Watch for signs of  labor. You may be going into labor if:  ? You have menstrual-like cramps, with or without nausea. ? You have about 6 or more contractions in 1 hour, even after you have had a glass of water and are resting. ? You have a low, dull backache that does not go away when you change your position. ? You have pain or pressure in your pelvis that comes and goes in a pattern. ? You have intestinal cramping or flu-like symptoms, with or without diarrhea.  ? You notice an increase or change in your vaginal discharge. Discharge may be heavy, mucus-like, watery, or streaked with blood. ? Your water breaks. · If you think you have  labor:  ? Drink 2 or 3 glasses of water or juice. Not drinking enough fluids can cause contractions. ? Stop what you are doing, and empty your bladder. Then lie down on your left side for at least 1 hour. ? While lying on your side, find your breast bone. Put your fingers in the soft spot just below it. Move your fingers down toward your belly button to find the top of your uterus. Check to see if it is tight. ? Contractions can be weak or strong. Record your contractions for an hour. Time a contraction from the start of one contraction to the start of the next one.  ? Single or several strong contractions without a pattern are called Ozark-Gonzalez contractions. They are practice contractions but not the start of labor. They often stop if you change what you are doing. ? Call your doctor if you have regular contractions. Where can you learn more? Go to http://sherlyn-lei.info/  Enter C666 in the search box to learn more about \"Weeks 26 to 30 of Your Pregnancy: Care Instructions. \"  Current as of: May 29, 2019Content Version: 12.4  © 7733-6064 [employer]. Care instructions adapted under license by [employer] (which disclaims liability or warranty for this information).  If you have questions about a medical condition or this instruction, always ask your healthcare professional. [place] [address] any warranty or liability for your use of this information. Weeks 26 to 30 of Your Pregnancy: Care Instructions  Your Care Instructions    You are now in your last trimester of pregnancy. Your baby is growing rapidly. And you'll probably feel your baby moving around more often. Your doctor may ask you to count your baby's kicks. Your back may ache as your body gets used to your baby's size and length. If you haven't already had the Tdap shot during this pregnancy, talk to your doctor about getting it. It will help protect your  against pertussis infection. During this time, it's important to take care of yourself and pay attention to what your body needs. If you feel sexual, explore ways to be close with your partner that match your comfort and desire. Use the tips provided in this care sheet to find ways to be sexual in your own way. Follow-up care is a key part of your treatment and safety. Be sure to make and go to all appointments, and call your doctor if you are having problems. It's also a good idea to know your test results and keep a list of the medicines you take. How can you care for yourself at home? Take it easy at work  · Take frequent breaks. If possible, stop working when you are tired, and rest during your lunch hour. · Take bathroom breaks every 2 hours. · Change positions often. If you sit for long periods, stand up and walk around. · When you stand for a long time, keep one foot on a low stool with your knee bent. After standing a lot, sit with your feet up. · Avoid fumes, chemicals, and tobacco smoke. Be sexual in your own way  · Having sex during pregnancy is okay, unless your doctor tells you not to. · You may be very interested in sex, or you may have no interest at all.   · Your growing belly can make it hard to find a good position during intercourse. [place] and explore. · You may get cramps in your uterus when your partner touches your breasts. · A back rub may relieve the backache or cramps that sometimes follow orgasm. Learn about  labor  · Watch for signs of  labor. You may be going into labor if:  ? You have menstrual-like cramps, with or without nausea. ? You have about 6 or more contractions in 1 hour, even after you have had a glass of water and are resting. ? You have a low, dull backache that does not go away when you change your position. ? You have pain or pressure in your pelvis that comes and goes in a pattern. ? You have intestinal cramping or flu-like symptoms, with or without diarrhea.  ? You notice an increase or change in your vaginal discharge. Discharge may be heavy, mucus-like, watery, or streaked with blood. ? Your water breaks. · If you think you have  labor:  ? Drink 2 or 3 glasses of water or juice. Not drinking enough fluids can cause contractions. ? Stop what you are doing, and empty your bladder. Then lie down on your left side for at least 1 hour. ? While lying on your side, find your breast bone. Put your fingers in the soft spot just below it. Move your fingers down toward your belly button to find the top of your uterus. Check to see if it is tight. ? Contractions can be weak or strong. Record your contractions for an hour. Time a contraction from the start of one contraction to the start of the next one.  ? Single or several strong contractions without a pattern are called Greg-Gonzalez contractions. They are practice contractions but not the start of labor. They often stop if you change what you are doing. ? Call your doctor if you have regular contractions. Where can you learn more? Go to http://sherlyn-lei.info/  Enter Y253 in the search box to learn more about \"Weeks 26 to 30 of Your Pregnancy: Care Instructions. \"  Current as of: May 29, 2019Content Version: 12.4  © 8016-5073 [employer]. Care instructions adapted under license by [employer] (which disclaims liability or warranty for this information). If you have questions about a medical condition or this instruction, always ask your healthcare professional. [address] any warranty or liability for your use of this information.

## 2020-04-07 ENCOUNTER — HOSPITAL ENCOUNTER (OUTPATIENT)
Dept: LAB | Age: 30
Discharge: HOME | End: 2020-04-07

## 2020-04-07 ENCOUNTER — ROUTINE PRENATAL (OUTPATIENT)
Dept: OBGYN CLINIC | Age: 30
End: 2020-04-07

## 2020-04-07 VITALS
WEIGHT: 169 LBS | DIASTOLIC BLOOD PRESSURE: 72 MMHG | BODY MASS INDEX: 26.53 KG/M2 | SYSTOLIC BLOOD PRESSURE: 112 MMHG | HEIGHT: 67 IN

## 2020-04-07 DIAGNOSIS — Z34.90 PRENATAL CARE, ANTEPARTUM: ICD-10-CM

## 2020-04-07 DIAGNOSIS — I10 CHRONIC HYPERTENSION: ICD-10-CM

## 2020-04-07 DIAGNOSIS — Z23 ENCOUNTER FOR IMMUNIZATION: ICD-10-CM

## 2020-04-07 DIAGNOSIS — I10 CHRONIC HYPERTENSION: Primary | ICD-10-CM

## 2020-04-07 DIAGNOSIS — Z34.00 PRENATAL CARE OF PRIMIGRAVIDA, ANTEPARTUM: ICD-10-CM

## 2020-04-07 LAB
ALBUMIN SERPL-MCNC: 2.9 G/DL (ref 3.5–5)
ALBUMIN/GLOB SERPL: 0.9 {RATIO} (ref 1.1–2.2)
ALP SERPL-CCNC: 85 U/L (ref 45–117)
ALT SERPL-CCNC: 16 U/L (ref 12–78)
ANION GAP SERPL CALC-SCNC: 6 MMOL/L (ref 5–15)
AST SERPL-CCNC: 15 U/L (ref 15–37)
BILIRUB SERPL-MCNC: 0.3 MG/DL (ref 0.2–1)
BILIRUB UR QL STRIP: NEGATIVE
BUN SERPL-MCNC: 5 MG/DL (ref 6–20)
BUN/CREAT SERPL: 9 (ref 12–20)
CALCIUM SERPL-MCNC: 8.4 MG/DL (ref 8.5–10.1)
CHLORIDE SERPL-SCNC: 107 MMOL/L (ref 97–108)
CO2 SERPL-SCNC: 25 MMOL/L (ref 21–32)
CREAT SERPL-MCNC: 0.56 MG/DL (ref 0.55–1.02)
ERYTHROCYTE [DISTWIDTH] IN BLOOD BY AUTOMATED COUNT: 13.4 % (ref 11.5–14.5)
GLOBULIN SER CALC-MCNC: 3.3 G/DL (ref 2–4)
GLUCOSE 1H P 100 G GLC PO SERPL-MCNC: 100 MG/DL (ref 65–140)
GLUCOSE SERPL-MCNC: 101 MG/DL (ref 65–100)
GLUCOSE UR-MCNC: NEGATIVE MG/DL
GTT, 1 HR, GLUCOLA, EXTERNAL: 100
HCT VFR BLD AUTO: 36.8 % (ref 35–47)
HCT, EXTERNAL: 36.8
HGB BLD-MCNC: 11.7 G/DL (ref 11.5–16)
HGB, EXTERNAL: 11.7
KETONES P FAST UR STRIP-MCNC: NEGATIVE MG/DL
MCH RBC QN AUTO: 30.2 PG (ref 26–34)
MCHC RBC AUTO-ENTMCNC: 31.8 G/DL (ref 30–36.5)
MCV RBC AUTO: 95.1 FL (ref 80–99)
NRBC # BLD: 0 K/UL (ref 0–0.01)
NRBC BLD-RTO: 0 PER 100 WBC
PH UR STRIP: 6.5 [PH] (ref 4.6–8)
PLATELET # BLD AUTO: NORMAL K/UL (ref 150–400)
POTASSIUM SERPL-SCNC: 3.8 MMOL/L (ref 3.5–5.1)
PROT SERPL-MCNC: 6.2 G/DL (ref 6.4–8.2)
PROT UR QL STRIP: NEGATIVE
RBC # BLD AUTO: 3.87 M/UL (ref 3.8–5.2)
SODIUM SERPL-SCNC: 138 MMOL/L (ref 136–145)
SP GR UR STRIP: 1 (ref 1–1.03)
TSH SERPL DL<=0.05 MIU/L-ACNC: 0.86 UIU/ML (ref 0.36–3.74)
UA UROBILINOGEN AMB POC: NORMAL (ref 0.2–1)
URINALYSIS CLARITY POC: CLEAR
URINALYSIS COLOR POC: YELLOW
URINE BLOOD POC: NEGATIVE
URINE LEUKOCYTES POC: NEGATIVE
URINE NITRITES POC: NEGATIVE
WBC # BLD AUTO: 10.1 K/UL (ref 3.6–11)

## 2020-04-07 NOTE — PROGRESS NOTES
After obtaining consent, and per orders of Dr. Gaby Calvin, injection of Tdap given in Left deltoid by Aj Guerrero. Patient instructed to remain in clinic for 20 minutes afterwards, and to report any adverse reaction to me immediately.

## 2020-04-07 NOTE — PROGRESS NOTES
[facility]  [email or web address]  [phone]    Claude Comber, MD, FACOG       OB/GYN: [facility] Problem visit    Chief Complaint:   Chief Complaint   Patient presents with    Routine Prenatal Visit       Patient Active Problem List    Diagnosis    Essential hypertension in pregnancy    Prenatal care     Hypertension UR SAMPLE EACH VISIT. FS @ [facility] for hydrochlorothiazide exposure. CHTN - scheduled 02/10/2020 @ 9:30am at [facility]  Baseline 24 hr urine  Flu shot 19  NIPTS low risk 19  Gender XX, ant plac  19 - hgb 14.3, CR0.60, AST 20, ALT 14, UR 24 hr protein ? Platelets clumped- needs drawn at next visit, will do AFP  24hr protein #168  AFP low risk - 2020  CBC - platelets were cancelled again due to aggregation - 2020  Glucola/cbc/tsh  CF/SMA WNL - 2019           History of Present Illness: The patient is a 26 y.o.  female who reports having symptoms of abdominal pressure and SOB when eating for 3 days. Patient states she went to the ER on 2020 due to Syncope. This is a new problem. This is a routinely scheduled OB appointment. She reports the symptoms has slightly improved. Aggravating factors include SOB when eating and abdominal pressure when sitting. Alleviating factors include none. She does not have other concerns.     PFSH:  Past Medical History:   Diagnosis Date    Herniation of intervertebral disc of thoracolumbar region     Hypertension     Pap smear for cervical cancer screening 2019    negative, HPV negative    TMJ condylar resorption      Past Surgical History:   Procedure Laterality Date    HX APPENDECTOMY      HX APPENDECTOMY  2000    HX TONSIL AND ADENOIDECTOMY      HX TONSILLECTOMY  2000    HX WISDOM TEETH EXTRACTION       Family History   Problem Relation Age of Onset    Hypertension Mother     Hypertension Father      Social History     Tobacco Use    Smoking status: Never Smoker    Smokeless tobacco: Never Used   Substance Use Topics    Alcohol use: Not Currently     Frequency: Never    Drug use: Never     Allergies   Allergen Reactions    Codeine Nausea and Vomiting     Current Outpatient Medications   Medication Sig    albuterol (PROVENTIL HFA, VENTOLIN HFA, PROAIR HFA) 90 mcg/actuation inhaler Take 2 Puffs by inhalation every four (4) hours as needed for Wheezing.  methyldopa (ALDOMET) 250 mg tablet Take 1 Tab by mouth three (3) times daily.  PNV HL.11/DWVCBWG fum/folic ac (PRENATAL PO) Take 1 Tab by mouth daily. No current facility-administered medications for this visit. Review of Systems:  History obtained from the patient and written ROS questionnaire  Constitutional: negative for fevers, chills and weight loss  ENT ROS: negative for - hearing change, oral lesions or visual changes  Respiratory: see HPI  Cardiovascular: negative for chest pain, irregular heart beats, exertional chest pressure/discomfort  Gastrointestinal: negative for dysphagia, nausea and vomiting  Genito-Urinary ROS: no dysuria, trouble voiding, or hematuria, see HPI  Inteument/breast: negative for rash, breast lump and nipple discharge  Musculoskeletal:negative for stiff joints, neck pain and muscle weakness  Endocrine ROS: negative for - breast changes, galactorrhea or temperature intolerance  Hematological and Lymphatic ROS: negative for - blood clots, bruising or swollen lymph nodes    Physical Exam:  Visit Vitals  /72 (BP 1 Location: Left arm, BP Patient Position: Sitting)   Ht 5' 7\" (1.702 m)   Wt 169 lb (76.7 kg)   BMI 26.47 kg/m²       GENERAL: alert, well appearing, and in no distress  HEAD; normocephalic, atraumatic  EXT no ct/e/  NEURO: alert, oriented, normal speech    See PN flowsheet for additional notes and exam    Assessment:  26 y.o.  28w2d   Encounter Diagnoses   Name Primary?     Chronic hypertension Yes    Prenatal care of primigravida, antepartum     Encounter for immunization        Plan:  An evaluation of this patient's concern is planned. The patient is advised that she should contact the office if she does not note improvement or if symptoms recur  She should contact our office with any questions or concerns  She could keep her routine OB appointment.    24 hr urine  PIH labs  Fall precautions  Disc option of cards referral (from er)  PIH precautions  Disc MFM fu  Repeat 24 hr urine,can bring when has MFM fu  tdap  Consent reviewed  Disc timing of delivery and possible IOL at 39 wks    Orders Placed This Encounter    TETANUS, [address] [place] [address] (TDAP), IN INDIVIDS. >=7, IM    GLUCOSE, GESTATIONAL 1 HR TOLERANCE    CBC W/O DIFF    TSH 3RD GENERATION    METABOLIC PANEL, COMPREHENSIVE    AMB POC URINALYSIS DIP STICK MANUAL W/O MICRO       Results for orders placed or performed in visit on 04/07/20   AMB POC URINALYSIS DIP STICK MANUAL W/O MICRO   Result Value Ref Range    Color (UA POC) Yellow     Clarity (UA POC) Clear     Glucose (UA POC) Negative Negative    Bilirubin (UA POC) Negative Negative    Ketones (UA POC) Negative Negative    Specific gravity (UA POC) 1.005 1.001 - 1.035    Blood (UA POC) Negative Negative    pH (UA POC) 6.5 4.6 - 8.0    Protein (UA POC) Negative Negative    Urobilinogen (UA POC) 0.2 mg/dL 0.2 - 1    Nitrites (UA POC) Negative Negative    Leukocyte esterase (UA POC) Negative Negative       Naomy Hansen MD

## 2020-04-11 PROBLEM — I10 ESSENTIAL HYPERTENSION: Status: ACTIVE | Noted: 2020-04-11

## 2020-04-11 NOTE — PROGRESS NOTES
Normal results, add to prenatal records. We can review in detail with patient at next visit.   See note re repeat PLAT next office visit on PL  Add [facility] labs to PL (hgb,plat,cr, ast, alt, ur: prot 24 hr total or ratio) w date  (when back)  Add tsh # an date to [facility]  udate pnl/glucola

## 2020-04-12 NOTE — ED PROVIDER NOTES
Patient is a 27-year-old female presenting to the emergency department after having a questionable syncopal versus seizure-like activity at home. Patient was not feeling well this morning and gone to the restroom  went to check on her patient went unresponsive. Patients  thought that there was some shaking activity. Patient thinks that she might of had a seizure while she was in college however she had never seen a neurologist and never been placed on anti-seizure medications. Patient is otherwise healthy she is currently 28 weeks pregnant and is taking blood pressure medications.       Seizure             Past Medical History:   Diagnosis Date    Herniation of intervertebral disc of thoracolumbar region     Hypertension     Pap smear for cervical cancer screening 12/02/2019    negative, HPV negative    TMJ condylar resorption        Past Surgical History:   Procedure Laterality Date    HX APPENDECTOMY      HX APPENDECTOMY  2000    HX TONSIL AND ADENOIDECTOMY      HX TONSILLECTOMY  2000    HX WISDOM TEETH EXTRACTION           Family History:   Problem Relation Age of Onset    Hypertension Mother     Hypertension Father        Social History     Socioeconomic History    Marital status:      Spouse name: Not on file    Number of children: Not on file    Years of education: Not on file    Highest education level: Not on file   Occupational History    Not on file   Social Needs    Financial resource strain: Not on file    Food insecurity     Worry: Not on file     Inability: Not on file   [employer] needs     Medical: Not on file     Non-medical: Not on file   Tobacco Use    Smoking status: Never Smoker    Smokeless tobacco: Never Used   Substance and Sexual Activity    Alcohol use: Not Currently     Frequency: Never    Drug use: Never    Sexual activity: Yes     Partners: Male     Birth control/protection: None   Lifestyle    Physical activity     Days per week: Not on file     Minutes per session: Not on file    Stress: Not on file   Relationships    Social connections     Talks on phone: Not on file     Gets together: Not on file     Attends Tenriism service: Not on file     Active member of club or organization: Not on file     Attends meetings of clubs or organizations: Not on file     Relationship status: Not on file    Intimate partner violence     Fear of current or ex partner: Not on file     Emotionally abused: Not on file     Physically abused: Not on file     Forced sexual activity: Not on file   Other Topics Concern    Not on file   Social History Narrative    ** Merged History Encounter **              ALLERGIES: Codeine    Review of Systems   Constitutional: Positive for fatigue. Neurological: Positive for seizures and syncope. All other systems reviewed and are negative. Vitals:    04/03/20 1230 04/03/20 1315 04/03/20 1330 04/03/20 1400   BP:   112/60 112/64   Pulse: 82 71 67 77   Resp: 18      Temp:       SpO2: 98% 98% 98% 100%   Weight:       Height:                Physical Exam  Vitals signs and nursing note reviewed. Constitutional:       General: She is not in acute distress. Appearance: She is well-developed. She is not diaphoretic. HENT:      Head: Normocephalic and atraumatic. Nose: Nose normal.      Mouth/Throat:      Pharynx: No oropharyngeal exudate. Eyes:      General: No scleral icterus. Right eye: No discharge. Left eye: No discharge. Conjunctiva/sclera: Conjunctivae normal.   Neck:      Musculoskeletal: Normal range of motion and neck supple. Thyroid: No thyromegaly. Vascular: No JVD. Trachea: No tracheal deviation. Cardiovascular:      Rate and Rhythm: Normal rate and regular rhythm. Heart sounds: Murmur present. Systolic murmur present with a grade of 3/6. No friction rub. No gallop. Pulmonary:      Effort: Pulmonary effort is normal. No respiratory distress.       Breath sounds: Normal breath sounds. No stridor. No wheezing or rales. Chest:      Chest wall: No tenderness. Abdominal:      General: Bowel sounds are normal. There is distension (gravid uterus consistent with dates. ). Palpations: There is no mass. Tenderness: There is no abdominal tenderness. There is no rebound. Musculoskeletal: Normal range of motion. General: No tenderness. Lymphadenopathy:      Cervical: No cervical adenopathy. Skin:     General: Skin is warm and dry. Coloration: Skin is not pale. Findings: No erythema or rash. Neurological:      Mental Status: She is alert and oriented to person, place, and time. Cranial Nerves: No cranial nerve deficit. Coordination: Coordination normal.   Psychiatric:         Behavior: Behavior normal.         Thought Content: Thought content normal.         Judgment: Judgment normal.          MDM  Number of Diagnoses or Management Options  Syncope and collapse:   Diagnosis management comments: A/P: Syncope. 75-year-old female presenting to the emergency department after likely syncopal episode as patient did not have a postictal phase. Per history sounds like a vasovagal syncope episode. Fetal heart rate 166. EKG, CBC, CMP, cardiac enzymes, UA. Discussed labs with patient also advised patient to follow-up with cardiology for further evaluation.        Amount and/or Complexity of Data Reviewed  Clinical lab tests: ordered and reviewed  Independent visualization of images, tracings, or specimens: yes    Risk of Complications, Morbidity, and/or Mortality  Presenting problems: moderate  Diagnostic procedures: moderate  Management options: moderate    Patient Progress  Patient progress: stable         Procedures

## 2020-04-15 ENCOUNTER — TELEPHONE (OUTPATIENT)
Dept: OBGYN CLINIC | Age: 30
End: 2020-04-15

## 2020-04-15 NOTE — TELEPHONE ENCOUNTER
Patient calling stating that she needs her 24 urine supplies and that she has not felt big movements since yesterday. Patient has been feeling subtle movements and she was advised that subtle movements count. She was advised to drinking something sugary and cold and count her movements. If she does not get 5 movements in 1 hour, she was instructed to contact me directly. Patient verbalized understanding. She was also advised that her 24 hr urine supplies are ready for her to .

## 2020-04-20 ENCOUNTER — VIRTUAL VISIT (OUTPATIENT)
Dept: OBGYN CLINIC | Age: 30
End: 2020-04-20

## 2020-04-20 ENCOUNTER — ROUTINE PRENATAL (OUTPATIENT)
Dept: OBGYN CLINIC | Age: 30
End: 2020-04-20

## 2020-04-20 VITALS — HEIGHT: 67 IN | BODY MASS INDEX: 23.07 KG/M2 | WEIGHT: 147 LBS

## 2020-04-20 DIAGNOSIS — Z34.00 PRENATAL CARE OF PRIMIGRAVIDA, ANTEPARTUM: ICD-10-CM

## 2020-04-20 DIAGNOSIS — O10.919 ESSENTIAL HYPERTENSION IN PREGNANCY: Primary | ICD-10-CM

## 2020-04-20 DIAGNOSIS — Z34.90 PRENATAL CARE, ANTEPARTUM: ICD-10-CM

## 2020-04-20 DIAGNOSIS — Z3A.30 30 WEEKS GESTATION OF PREGNANCY: ICD-10-CM

## 2020-04-20 NOTE — PATIENT INSTRUCTIONS

## 2020-04-20 NOTE — Clinical Note
1: confirm 2wk vv 2: add to labs on thur 1030 am lab only: platelets RESUBMIT SODIUM CITRATE (BLUE) AND EDTA (LAVENDER) TUBES FOR HEMATOLOGICAL TESTING. And dropping off 24 hr urine prot and creat.   Code: HTN in preg

## 2020-04-20 NOTE — PROGRESS NOTES
[facility] Virtual Video visit    Julissa Hood is a 26 y.o. female who was seen by synchronous (real-time) audio-video technology on 4/20/2020. We discussed the expected course, resolution and complications of the diagnosis(es) in detail. Medication risks, benefits, costs, interactions, and alternatives were discussed as indicated. I advised her to contact the office if her condition worsens, changes or fails to improve as anticipated. She expressed understanding with the diagnosis(es) and plan. Pursuant to the emergency declaration under the [facility], [facility] waiver authority and the [employer] and [employer] Act, this Virtual  Visit was conducted, with patient's consent, to reduce the patient's risk of exposure to COVID-19 and provide continuity of care for an established patient. Services were provided through a video synchronous discussion virtually to substitute for in-person clinic visit.         See ob note

## 2020-04-20 NOTE — PROGRESS NOTES
[facility] Virtual Video visit    Jovon Huber is a 26 y.o. female who was seen by synchronous (real-time) audio-video technology on 4/20/2020. We discussed the expected course, resolution and complications of the diagnosis(es) in detail. Medication risks, benefits, costs, interactions, and alternatives were discussed as indicated. I advised her to contact the office if her condition worsens, changes or fails to improve as anticipated. She expressed understanding with the diagnosis(es) and plan. Pursuant to the emergency declaration under the [facility], [facility] waiver authority and the [employer] and [employer] Act, this Virtual  Visit was conducted, with patient's consent, to reduce the patient's risk of exposure to COVID-19 and provide continuity of care for an established patient. Services were provided through a video synchronous discussion virtually to substitute for in-person clinic visit. By [facility]  [email or web address]  [phone]    Merry Peña MD, FACOG     Follow-up OB visit    Chief Complaint   Patient presents with    Routine Prenatal Visit       Vitals:    04/20/20 0833   Weight: 147 lb (66.7 kg)   Height: 5' 7\" (1.702 m)       Patient Active Problem List    Diagnosis Date Noted    Essential hypertension 04/11/2020    Essential hypertension in pregnancy 01/27/2020    Prenatal care 12/02/2019        The patient reports the following concerns: none, feeling good no more dizzy episodes/near syncope  Good FM since dec x 1 last week,  Sees [facility] chang      Objective:     General: alert, cooperative, no distress   Mental  status: mental status: alert, oriented to person, place, and time, normal mood, behavior, speech, dress, motor activity, and thought processes   Resp: resp: normal effort and no respiratory distress   Neuro: neuro: no gross deficits   Skin: skin: no discoloration or lesions of concern on visible areas   Due to this being a TeleHealth evaluation, many elements of the physical examination are unable to be assessed. Prenatal Visit    Vitals:    20 0833   Weight: 147 lb (66.7 kg)   Height: 5' 7\" (1.702 m)     See PN flowsheet for exam      26 y.o.  30w1d   Encounter Diagnoses   Name Primary?  Essential hypertension in pregnancy Yes    Prenatal care of primigravida, antepartum     30 weeks gestation of pregnancy          PIH precautions  Labs when drops off 24 hr urine thur: platelets  RESUBMIT SODIUM CITRATE (BLUE) AND EDTA (LAVENDER) TUBES FOR HEMATOLOGICAL TESTING. [] SAB/bleeding precautions reviewed   [x] PTL/PPROM precautions reviewed   [] Labor precautions reviewed   [] Fetal kick counts discussed   [] Labs reviewed with patient   [] Manuel Barge precautions reviewed   [] Consent reviewed   [] Handouts given to pt   [] Glucola handout    [] GBS/labor/Magic Hour handout   []    [] We reviewed CDC recommendations for Tdap for patient and close contacts and RBA of receiving in pregnancy, advised obtaining in third trimester   [] Reviewed healthy nutrition in pregnancy and good exercise practices   [] We disc safer medications in pregnancy and referred patient to [facility] NUBIA resources   [] We reviewed CDC recommendations for flu vaccine and RBA of receiving in pregnancy   []    []    []           No orders of the defined types were placed in this encounter.       Nara Ribera MD

## 2020-04-23 ENCOUNTER — HOSPITAL ENCOUNTER (OUTPATIENT)
Dept: PERINATAL CARE | Age: 30
Discharge: HOME | End: 2020-04-23
Attending: OBSTETRICS & GYNECOLOGY
Payer: COMMERCIAL

## 2020-04-23 PROCEDURE — 76816 OB US FOLLOW-UP PER FETUS: CPT | Performed by: OBSTETRICS & GYNECOLOGY

## 2020-04-27 ENCOUNTER — TELEPHONE (OUTPATIENT)
Dept: OBGYN CLINIC | Age: 30
End: 2020-04-27

## 2020-04-27 NOTE — TELEPHONE ENCOUNTER
Patient of TP  31 weeks 1 d    Calling to verify that you are okay with the fact that anytime she eats or lays down to sleep the baby starts to hiccup and hiccups for 15-20 minutes. This all started after her ultrasound last Thursday. I know in the past, triage has advised of normal pregnancy issue, just verifying your thoughts please since this is new for this patient.

## 2020-04-30 NOTE — PATIENT INSTRUCTIONS

## 2020-05-04 ENCOUNTER — VIRTUAL VISIT (OUTPATIENT)
Dept: OBGYN CLINIC | Age: 30
End: 2020-05-04

## 2020-05-04 ENCOUNTER — ROUTINE PRENATAL (OUTPATIENT)
Dept: OBGYN CLINIC | Age: 30
End: 2020-05-04

## 2020-05-04 VITALS — BODY MASS INDEX: 23.07 KG/M2 | WEIGHT: 147 LBS | HEIGHT: 67 IN

## 2020-05-04 DIAGNOSIS — Z34.00 PRENATAL CARE OF PRIMIGRAVIDA, ANTEPARTUM: ICD-10-CM

## 2020-05-04 DIAGNOSIS — O10.919 ESSENTIAL HYPERTENSION IN PREGNANCY: Primary | ICD-10-CM

## 2020-05-04 NOTE — PROGRESS NOTES
[facility] Virtual Video visit    Billy Molina 26 y.o.  female who was seen by synchronous (real-time) audio-video technology on 2020. We discussed the expected course, resolution and complications of the diagnosis(es) in detail. Medication risks, benefits, costs, interactions, and alternatives were discussed as indicated. I advised her to contact the office if her condition worsens, changes or fails to improve as anticipated. She expressed understanding with the diagnosis(es) and plan. Pursuant to the emergency declaration under the [facility], [facility] waiver authority and the [employer] and [employer] Act, this Virtual  Visit was conducted, with patient's consent, to reduce the patient's risk of exposure to COVID-19 and provide continuity of care for an established patient. Services were provided through a video synchronous discussion virtually to substitute for in-person clinic visit. [facility]  [email or web address]  [phone]    Edgar Chris MD, FACOG     Follow-up OB visit    No chief complaint on file. Vitals:    20 0835   Weight: 147 lb (66.7 kg)   Height: 5' 7\" (1.702 m)       Patient Active Problem List    Diagnosis Date Noted    Prenatal care of primigravida, antepartum 2020    Essential hypertension 2020    Essential hypertension in pregnancy 2020    Prenatal care 2019        The patient reports the following concerns of fluctuating blood pressure and pulse. Patient states she has been taking her blood pressure and pulse at home. It has been normal today and over the weekend. However, last week her blood pressure went up to 157/98, pulse 100 no edema. Had some SOB last week, that improve. Good FM, No ha/cp/ruq pain.    Prenatal Visit    Vitals:    20 0835   Weight: 147 lb (66.7 kg)   Height: 5' 7\" (1.702 m) See PN flowsheet for exam    Objective:     General: alert, cooperative, no distress   Mental  status: mental status: alert, oriented to person, place, and time, normal mood, behavior, speech, dress, motor activity, and thought processes   Resp: resp: normal effort and no respiratory distress   Neuro: neuro: no gross deficits   Skin: skin: no discoloration or lesions of concern on visible areas   Due to this being a TeleHealth evaluation, many elements of the physical examination are unable to be assessed. 26 y.o.  32w1d   Encounter Diagnoses   Name Primary?  Essential hypertension in pregnancy Yes    Prenatal care of primigravida, antepartum        This patient was seen virtually during the COVID-19 pandemic. Please note some clinical care decisions may be influenced because of the current outbreak. Rec OV this week and drop off 24 hr urine  Strict PIH prec   If severe range BP, new sx: rec notify MD     [] SAB/bleeding precautions reviewed   [] PTL/PPROM precautions reviewed   [] Labor precautions reviewed   [] Fetal kick counts discussed   [] Labs reviewed with patient   [] Manuel Barge precautions reviewed   [] Consent reviewed   [] Handouts given to pt   [] Glucola handout    [] GBS/labor/Magic Hour handout   []    [] We reviewed CDC recommendations for Tdap for patient and close contacts and RBA of receiving in pregnancy, advised obtaining in third trimester   [] Reviewed healthy nutrition in pregnancy and good exercise practices   [] We disc safer medications in pregnancy and referred patient to [facility] NUBIA resources   [] We reviewed CDC recommendations for flu vaccine and RBA of receiving in pregnancy   []    []    []           No orders of the defined types were placed in this encounter.       Nara Ribera MD

## 2020-05-05 NOTE — PATIENT INSTRUCTIONS
Weeks 32 to 34 of Your Pregnancy: Care Instructions Your Care Instructions During the last few weeks of your pregnancy, you may have more aches and pains. It's important to rest when you can. Your growing baby is putting more pressure on your bladder. So you may need to urinate more often. Hemorrhoids are also common. These are painful, itchy veins in the rectal area. In the 36th week, most women have a test for group B streptococcus (GBS). GBS is a common bacteria that can live in the vagina and rectum. It can make your baby sick after birth. If you test positive, you will get antibiotics during labor. These will keep your baby from getting the bacteria. You may want to talk with your doctor about banking your baby's umbilical cord blood. This is the blood left in the cord after birth. If you want to save this blood, you must arrange it ahead of time. You can't decide at the last minute. If you haven't already had the Tdap shot during this pregnancy, talk to your doctor about getting it. It will help protect your  against pertussis infection. Follow-up care is a key part of your treatment and safety. Be sure to make and go to all appointments, and call your doctor if you are having problems. It's also a good idea to know your test results and keep a list of the medicines you take. How can you care for yourself at home? Ease hemorrhoids · Get more liquids, fruits, vegetables, and fiber in your diet. This will help keep your stools soft. · Avoid sitting for too long. Lie on your left side several times a day. · Clean yourself with soft, moist toilet paper. Or you can use witch hazel pads or personal hygiene pads. · If you are uncomfortable, try ice packs. Or you can sit in a warm sitz bath. Do these for 20 minutes at a time, as needed. · Use hydrocortisone cream for pain and itching. Two examples are Anusol and Preparation H Hydrocortisone. · Ask your doctor about taking an over-the-counter stool softener. Consider breastfeeding · Experts recommend that women breastfeed for 1 year or longer. Breast milk is the perfect food for babies. · Breast milk is easier for babies to digest than formula. And it is always available, just the right temperature, and free. · Breast milk may help protect your child from some health problems.  babies are less likely than formula-fed babies to: 
? Get ear infections, colds, diarrhea, and pneumonia. ? Be obese or get diabetes later in life. · Women who breastfeed have less bleeding after the birth. Their uteruses also shrink back faster. · Some women who breastfeed lose weight faster. Making milk burns calories. · Breastfeeding can lower your risk of breast cancer, ovarian cancer, and osteoporosis. Decide about circumcision for boys · As you make this decision, it may help to think about your personal, Quaker, and family traditions. You get to decide if you will keep your son's penis natural or if he will be circumcised. · If you decide that you would like to have your baby circumcised, talk with your doctor. You can share your concerns about pain. And you can discuss your preferences for anesthesia. Where can you learn more? Go to http://sherlyn-lei.info/ Enter S574 in the search box to learn more about \"Weeks 32 to 34 of Your Pregnancy: Care Instructions. \" Current as of: May 29, 2019Content Version: 12.4 © 3403-1032 Healthwise, Incorporated. Care instructions adapted under license by [employer] (which disclaims liability or warranty for this information). If you have questions about a medical condition or this instruction, always ask your healthcare professional. [place] [address] any warranty or liability for your use of this information. Weeks 32 to 34 of Your Pregnancy: Care Instructions Your Care Instructions During the last few weeks of your pregnancy, you may have more aches and pains. It's important to rest when you can. Your growing baby is putting more pressure on your bladder. So you may need to urinate more often. Hemorrhoids are also common. These are painful, itchy veins in the rectal area. In the 36th week, most women have a test for group B streptococcus (GBS). GBS is a common bacteria that can live in the vagina and rectum. It can make your baby sick after birth. If you test positive, you will get antibiotics during labor. These will keep your baby from getting the bacteria. You may want to talk with your doctor about banking your baby's umbilical cord blood. This is the blood left in the cord after birth. If you want to save this blood, you must arrange it ahead of time. You can't decide at the last minute. If you haven't already had the Tdap shot during this pregnancy, talk to your doctor about getting it. It will help protect your  against pertussis infection. Follow-up care is a key part of your treatment and safety. Be sure to make and go to all appointments, and call your doctor if you are having problems. It's also a good idea to know your test results and keep a list of the medicines you take. How can you care for yourself at home? Ease hemorrhoids · Get more liquids, fruits, vegetables, and fiber in your diet. This will help keep your stools soft. · Avoid sitting for too long. Lie on your left side several times a day. · Clean yourself with soft, moist toilet paper. Or you can use witch hazel pads or personal hygiene pads. · If you are uncomfortable, try ice packs. Or you can sit in a warm sitz bath. Do these for 20 minutes at a time, as needed. · Use hydrocortisone cream for pain and itching. Two examples are Anusol and Preparation H Hydrocortisone. · Ask your doctor about taking an over-the-counter stool softener. Consider breastfeeding · Experts recommend that women breastfeed for 1 year or longer. Breast milk is the perfect food for babies. · Breast milk is easier for babies to digest than formula. And it is always available, just the right temperature, and free. · Breast milk may help protect your child from some health problems.  babies are less likely than formula-fed babies to: 
? Get ear infections, colds, diarrhea, and pneumonia. ? Be obese or get diabetes later in life. · Women who breastfeed have less bleeding after the birth. Their uteruses also shrink back faster. · Some women who breastfeed lose weight faster. Making milk burns calories. · Breastfeeding can lower your risk of breast cancer, ovarian cancer, and osteoporosis. Decide about circumcision for boys · As you make this decision, it may help to think about your personal, Amish, and family traditions. You get to decide if you will keep your son's penis natural or if he will be circumcised. · If you decide that you would like to have your baby circumcised, talk with your doctor. You can share your concerns about pain. And you can discuss your preferences for anesthesia. Where can you learn more? Go to http://sherlyn-lei.info/ Enter N147 in the search box to learn more about \"Weeks 32 to 34 of Your Pregnancy: Care Instructions. \" Current as of: May 29, 2019Content Version: 12.4 © 4074-8755 Healthwise, Incorporated. Care instructions adapted under license by [employer] (which disclaims liability or warranty for this information). If you have questions about a medical condition or this instruction, always ask your healthcare professional. [place] [address] any warranty or liability for your use of this information. High Blood Pressure in Pregnancy: Care Instructions Your Care Instructions High blood pressure (hypertension) means that the force of blood against your artery walls is too strong. Mild high blood pressure during pregnancy is not usually dangerous. Your doctor will probably just want to watch you closely. But when blood pressure is very high, it can reduce oxygen to your baby. This can affect how well your baby grows. High blood pressure also means that you are at higher risk for: · Preeclampsia. This is a problem that includes high blood pressure and damage to your liver or kidneys. It can also reduce how much oxygen your baby gets. In some cases, it leads to eclampsia. Eclampsia causes seizures. · Placental abruption. This is a problem when the placenta separates from the uterus before birth. It prevents the baby from getting enough oxygen and nutrients. Sometimes it can cause death for the baby and the mother. To prevent problems for you or your baby, you will have to check your blood pressure often. You will do this until after your baby is born. If your blood pressure rises suddenly or is very high during your pregnancy, your doctor may prescribe medicines. They can usually control blood pressure. If your blood pressure affects your or your baby's health, your doctor may need to deliver your baby early. After your baby is born, your blood pressure will probably improve. But sometimes blood pressure problems continue after birth. Follow-up care is a key part of your treatment and safety. Be sure to make and go to all appointments, and call your doctor if you are having problems. It's also a good idea to know your test results and keep a list of the medicines you take. How can you care for yourself at home? · Take and write down your blood pressure at home if your doctor says to. · Take your medicines exactly as prescribed. Call your doctor if you think you are having a problem with your medicine. · Do not smoke. This is one of the best things you can do to help your baby be healthy.  If you need help quitting, talk to your doctor about stop-smoking programs and medicines. These can increase your chances of quitting for good. · Don't gain too much weight during pregnancy. Talk to your doctor about how much weight gain is healthy. · Eat a healthy diet. · If your doctor says it's okay, get regular exercise. Walking or swimming several times a week can be healthy for you and your baby. · Reduce stress, and find time to relax. This is very important if you continue to work or have a busy schedule. It's also important if you have small children at home. When should you call for help? Call 911 anytime you think you may need emergency care. For example, call if: 
  · You passed out (lost consciousness).  
  · You have a seizure.  
 Call your doctor now or seek immediate medical care if: 
  · You have symptoms of preeclampsia, such as: 
? Sudden swelling of your face, hands, or feet. ? New vision problems (such as dimness, blurring, or seeing spots). ? A severe headache.  
  · Your blood pressure is higher than it should be or rises suddenly.  
  · You have new nausea or vomiting.  
  · You think that you are in labor.  
  · You have pain in your belly or pelvis.  
 Watch closely for changes in your health, and be sure to contact your doctor if: 
  · You gain weight rapidly. Where can you learn more? Go to http://sherlyn-lei.info/ Enter [phone] in the search box to learn more about \"High Blood Pressure in Pregnancy: Care Instructions. \" Current as of: May 29, 2019Content Version: 12.4 © 4241-3098 Healthwise, Incorporated. Care instructions adapted under license by [employer] (which disclaims liability or warranty for this information). If you have questions about a medical condition or this instruction, always ask your healthcare professional. [address] any warranty or liability for your use of this information.

## 2020-05-06 ENCOUNTER — ROUTINE PRENATAL (OUTPATIENT)
Dept: OBGYN CLINIC | Age: 30
End: 2020-05-06

## 2020-05-06 ENCOUNTER — HOSPITAL ENCOUNTER (OUTPATIENT)
Dept: LAB | Age: 30
Discharge: HOME | End: 2020-05-06

## 2020-05-06 VITALS
HEIGHT: 67 IN | DIASTOLIC BLOOD PRESSURE: 78 MMHG | SYSTOLIC BLOOD PRESSURE: 116 MMHG | BODY MASS INDEX: 23.76 KG/M2 | WEIGHT: 151.4 LBS

## 2020-05-06 DIAGNOSIS — O10.919 ESSENTIAL HYPERTENSION IN PREGNANCY: Primary | ICD-10-CM

## 2020-05-06 DIAGNOSIS — O10.919 ESSENTIAL HYPERTENSION IN PREGNANCY: ICD-10-CM

## 2020-05-06 LAB
ALBUMIN SERPL-MCNC: 2.8 G/DL (ref 3.5–5)
ALBUMIN/GLOB SERPL: 0.8 {RATIO} (ref 1.1–2.2)
ALP SERPL-CCNC: 115 U/L (ref 45–117)
ALT SERPL-CCNC: 17 U/L (ref 12–78)
ANION GAP SERPL CALC-SCNC: 6 MMOL/L (ref 5–15)
AST SERPL-CCNC: 22 U/L (ref 15–37)
BASOPHILS # BLD: 0 K/UL (ref 0–0.1)
BASOPHILS NFR BLD: 0 % (ref 0–1)
BILIRUB SERPL-MCNC: 0.3 MG/DL (ref 0.2–1)
BUN SERPL-MCNC: 10 MG/DL (ref 6–20)
BUN/CREAT SERPL: 17 (ref 12–20)
CALCIUM SERPL-MCNC: 9 MG/DL (ref 8.5–10.1)
CHLORIDE SERPL-SCNC: 107 MMOL/L (ref 97–108)
CO2 SERPL-SCNC: 24 MMOL/L (ref 21–32)
CREAT SERPL-MCNC: 0.6 MG/DL (ref 0.55–1.02)
DIFFERENTIAL METHOD BLD: ABNORMAL
EOSINOPHIL # BLD: 0.3 K/UL (ref 0–0.4)
EOSINOPHIL NFR BLD: 3 % (ref 0–7)
ERYTHROCYTE [DISTWIDTH] IN BLOOD BY AUTOMATED COUNT: 12.5 % (ref 11.5–14.5)
GLOBULIN SER CALC-MCNC: 3.3 G/DL (ref 2–4)
GLUCOSE SERPL-MCNC: 81 MG/DL (ref 65–100)
HCT VFR BLD AUTO: 35.7 % (ref 35–47)
HGB BLD-MCNC: 11.4 G/DL (ref 11.5–16)
IMM GRANULOCYTES # BLD AUTO: 0.1 K/UL (ref 0–0.04)
IMM GRANULOCYTES NFR BLD AUTO: 1 % (ref 0–0.5)
LYMPHOCYTES # BLD: 1.3 K/UL (ref 0.8–3.5)
LYMPHOCYTES NFR BLD: 12 % (ref 12–49)
MCH RBC QN AUTO: 28.6 PG (ref 26–34)
MCHC RBC AUTO-ENTMCNC: 31.9 G/DL (ref 30–36.5)
MCV RBC AUTO: 89.5 FL (ref 80–99)
MONOCYTES # BLD: 1.1 K/UL (ref 0–1)
MONOCYTES NFR BLD: 10 % (ref 5–13)
NEUTS SEG # BLD: 8.3 K/UL (ref 1.8–8)
NEUTS SEG NFR BLD: 74 % (ref 32–75)
NRBC # BLD: 0 K/UL (ref 0–0.01)
NRBC BLD-RTO: 0 PER 100 WBC
PLATELET # BLD AUTO: ABNORMAL K/UL (ref 150–400)
POTASSIUM SERPL-SCNC: 4 MMOL/L (ref 3.5–5.1)
PROT SERPL-MCNC: 6.1 G/DL (ref 6.4–8.2)
RBC # BLD AUTO: 3.99 M/UL (ref 3.8–5.2)
RBC MORPH BLD: ABNORMAL
SODIUM SERPL-SCNC: 137 MMOL/L (ref 136–145)
WBC # BLD AUTO: 11.1 K/UL (ref 3.6–11)

## 2020-05-06 NOTE — PROGRESS NOTES
[facility]  [email or web address]  [phone]    Baron Aguilar MD, FACOG     Follow-up OB visit    Chief Complaint   Patient presents with    Routine Prenatal Visit       Vitals:    20 0826   BP: 116/78   Weight: 151 lb 6.4 oz (68.7 kg)   Height: 5' 7\" (1.702 m)       Patient Active Problem List    Diagnosis Date Noted    Prenatal care of primigravida, antepartum 2020    Essential hypertension 2020    Essential hypertension in pregnancy 2020    Prenatal care 2019        The patient reports the following concerns of right side rib discomfort x 4 days. Had some higher BP at home  Prenatal Visit    Vitals:    20 0826   BP: 116/78   Weight: 151 lb 6.4 oz (68.7 kg)   Height: 5' 7\" (1.702 m)     See PN flowsheet for exam      26 y.o.  32w3d   Encounter Diagnosis   Name Primary?  Essential hypertension in pregnancy Yes     24 hr urine  BP log  Keep MFM fu  CMP/CBC  PIH precautions   [] SAB/bleeding precautions reviewed   [x] PTL/PPROM precautions reviewed   [] Labor precautions reviewed   [] Fetal kick counts discussed   [] Labs reviewed with patient   [] Fuller Tete precautions reviewed   [] Consent reviewed   [] Handouts given to pt   [] Glucola handout    [] GBS/labor/Magic Hour handout   []    [] We reviewed CDC recommendations for Tdap for patient and close contacts and RBA of receiving in pregnancy, advised obtaining in third trimester   [] Reviewed healthy nutrition in pregnancy and good exercise practices   [] We disc safer medications in pregnancy and referred patient to [place] [facility] resources   [] We reviewed CDC recommendations for flu vaccine and RBA of receiving in pregnancy   []    []    []       Follow-up and Dispositions    · Return in about 2 weeks (around 2020) for Follow up OB visit.          Orders Placed This Encounter    CREATININE, UR, 24 HR    PROTEIN, URINE, 24 HR       Baron Aguilar MD

## 2020-05-12 LAB
CREAT 24H UR-MRATE: 1026 MG/24 HR (ref 800–1800)
CREAT UR-MCNC: 29.3 MG/DL
PROT 24H UR-MRATE: 193 MG/24 HR (ref 30–150)
PROT UR-MCNC: 5.5 MG/DL

## 2020-05-12 NOTE — PROGRESS NOTES
The results are normal.   Please notify patient if not on [facility]. Recommend f/u if still having symptoms/problems or has additional concerns.   Update on PL 24 hr prot

## 2020-05-12 NOTE — PROGRESS NOTES
See  message  Add PIH labs to PL (hgb,plat,cr, ast, alt, ur: prot 24 hr total or ratio) w date   Notify patient even if [address] message read  Update chart, PN labs/problem list, if needed  Rec repeat labs at next OV  Plat: unable to run: rec plat RESUBMIT SODIUM CITRATE (BLUE) AND EDTA (LAVENDER) TUBES FOR HEMATOLOGICAL TESTING. (tickle, add to notes for NV)

## 2020-05-18 ENCOUNTER — VIRTUAL VISIT (OUTPATIENT)
Dept: OBGYN CLINIC | Age: 30
End: 2020-05-18

## 2020-05-18 ENCOUNTER — ROUTINE PRENATAL (OUTPATIENT)
Dept: OBGYN CLINIC | Age: 30
End: 2020-05-18

## 2020-05-18 VITALS — WEIGHT: 151 LBS | HEIGHT: 67 IN | BODY MASS INDEX: 23.7 KG/M2

## 2020-05-18 DIAGNOSIS — Z3A.34 34 WEEKS GESTATION OF PREGNANCY: ICD-10-CM

## 2020-05-18 DIAGNOSIS — O10.919 ESSENTIAL HYPERTENSION IN PREGNANCY: ICD-10-CM

## 2020-05-18 DIAGNOSIS — Z34.00 PRENATAL CARE OF PRIMIGRAVIDA, ANTEPARTUM: Primary | ICD-10-CM

## 2020-05-18 DIAGNOSIS — Z34.90 PRENATAL CARE, ANTEPARTUM: ICD-10-CM

## 2020-05-18 NOTE — PROGRESS NOTES
[facility] Virtual Video visit    Julissa Hood 26 y.o.  female who was seen by synchronous (real-time) audio-video technology on 2020. We discussed the expected course, resolution and complications of the diagnosis(es) in detail. Medication risks, benefits, costs, interactions, and alternatives were discussed as indicated. I advised her to contact the office if her condition worsens, changes or fails to improve as anticipated. She expressed understanding with the diagnosis(es) and plan. Pursuant to the emergency declaration under the [facility], [facility] waiver authority and the [employer] and [employer] Act, this Virtual  Visit was conducted, with patient's consent, to reduce the patient's risk of exposure to COVID-19 and provide continuity of care for an established patient. Services were provided through a video synchronous discussion virtually to substitute for in-person clinic visit. r [facility]  [email or web address]  [phone]    Latonya Rogers MD, FACOG     Follow-up OB visit    Chief Complaint   Patient presents with    Routine Prenatal Visit       Next appointment date: 2020  In office: Yes    Vitals:    20 0847   Weight: 151 lb (68.5 kg)   Height: 5' 7\" (1.702 m)       Patient Active Problem List    Diagnosis Date Noted    Prenatal care of primigravida, antepartum 2020    Essential hypertension 2020    Essential hypertension in pregnancy 2020    Prenatal care 2019        The patient reports the following concerns: none, mild swelling right foot    Prenatal Visit    Vitals:    20 0847   Weight: 151 lb (68.5 kg)   Height: 5' 7\" (1.702 m)     See PN flowsheet for exam    Objective:     General: alert, cooperative, no distress   Mental  status: mental status: alert, oriented to person, place, and time, normal mood, behavior, speech, dress, motor activity, and thought processes   Resp: resp: normal effort and no respiratory distress   Neuro: neuro: no gross deficits   Skin: skin: no discoloration or lesions of concern on visible areas   Due to this being a TeleHealth evaluation, many elements of the physical examination are unable to be assessed. 26 y.o.  34w1d   Encounter Diagnoses   Name Primary?  Prenatal care of primigravida, antepartum Yes    Essential hypertension in pregnancy     34 weeks gestation of pregnancy        This patient was seen virtually during the COVID-19 pandemic. Please note some clinical care decisions may be influenced because of the current outbreak. Reviewed PIH precautions  DVT/PE prec reviewed w pt     [] SAB/bleeding precautions reviewed   [] PTL/PPROM precautions reviewed   [] Labor precautions reviewed   [] Fetal kick counts discussed   [] Labs reviewed with patient   [] Mertie Gains precautions reviewed   [] Consent reviewed   [] Handouts given to pt   [] Glucola handout    [] GBS/labor/Magic Hour handout   []    [] We reviewed CDC recommendations for Tdap for patient and close contacts and RBA of receiving in pregnancy, advised obtaining in third trimester   [] Reviewed healthy nutrition in pregnancy and good exercise practices   [] We disc safer medications in pregnancy and referred patient to [facility] resources   [] We reviewed CDC recommendations for flu vaccine and RBA of receiving in pregnancy   []    []    []       Follow-up and Dispositions    · Return in about 2 weeks (around 2020) for Follow up OB visit JAYSHREE Mar No orders of the defined types were placed in this encounter.       Kelsie Dudley MD

## 2020-05-18 NOTE — PATIENT INSTRUCTIONS
Weeks 34 to 36 of Your Pregnancy: Care Instructions  Your Care Instructions    By now, your baby and your belly have grown quite large. It is almost time to give birth. Your baby's lungs are almost ready to breathe air. The bones in your baby's head are now firm enough to protect it, but soft enough to move down through the birth canal.  You may feel excited, happy, anxious, or scared. You may wonder how you will know if you are in labor or what to expect during labor. Try to be flexible in your expectations of the birth. Because each birth is different, there is no way to know exactly what childbirth will be like for you. This care sheet will help you know what to expect and how to prepare. This may make your childbirth easier. If you haven't already had the Tdap shot during this pregnancy, talk to your doctor about getting it. It will help protect your  against pertussis infection. In the 36th week, most women have a test for group B streptococcus (GBS). GBS is a common bacteria that can live in the vagina and rectum. It can make your baby sick after birth. If you test positive, you will get antibiotics during labor. The medicine will keep your baby from getting the bacteria. Follow-up care is a key part of your treatment and safety. Be sure to make and go to all appointments, and call your doctor if you are having problems. It's also a good idea to know your test results and keep a list of the medicines you take. How can you care for yourself at home? Learn about pain relief choices  · Pain is different for every woman. Talk with your doctor about your feelings about pain. · You can choose from several types of pain relief. These include medicine or breathing techniques, as well as comfort measures. You can use more than one option. · If you choose to have pain medicine during labor, talk to your doctor about your options. Some medicines lower anxiety and help with some of the pain.  Others make your lower body numb so that you won't feel pain. · Be sure to tell your doctor about your pain medicine choice before you start labor or very early in your labor. You may be able to change your mind as labor progresses. · Rarely, a woman is put to sleep by medicine given through a mask or an IV. Labor and delivery  · The first stage of labor has three parts: early, active, and transition. ? Most women have early labor at home. You can stay busy or rest, eat light snacks, drink clear fluids, and start counting contractions. ? When talking during a contraction gets hard, you may be moving to active labor. During active labor, you should head for the hospital if you are not there already. ? You are in active labor when contractions come every 3 to 4 minutes and last about 60 seconds. Your cervix is opening more rapidly. ? If your water breaks, contractions will come faster and stronger. ? During transition, your cervix is stretching, and contractions are coming more rapidly. ? You may want to push, but your cervix might not be ready. Your doctor will tell you when to push. · The second stage starts when your cervix is completely opened and you are ready to push. ? Contractions are very strong to push the baby down the birth canal.  ? You will feel the urge to push. You may feel like you need to have a bowel movement. ? You may be coached to push with contractions. These contractions will be very strong, but you will not have them as often. You can get a little rest between contractions. ? You may be emotional and irritable. You may not be aware of what is going on around you.  ? One last push, and your baby is born. · The third stage is when a few more contractions push out the placenta. This may take 30 minutes or less. · The fourth stage is the welcome recovery. You may feel overwhelmed with emotions and exhausted but alert. This is a good time to start breastfeeding. Where can you learn more?   Go to [email or web address]  Enter H648 in the search box to learn more about \"Weeks 34 to 36 of Your Pregnancy: Care Instructions. \"  Current as of: May 29, 2019Content Version: 12.4  © 7502-0719 [employer], [employer]orporated. Care instructions adapted under license by [employer] (which disclaims liability or warranty for this information). If you have questions about a medical condition or this instruction, always ask your healthcare professional. [place] [address] any warranty or liability for your use of this information.       Thank you for choosing [facility] for your pregnancy and birth care    5/18/2020  I will provide more information as we get closer to your due date. [address] [facility] has additional precautions in place in light of the COVID-19 pandemic to help ensure the safety of you and your family:    [facility] We have temporarily reduced the entry points to our facilities to better protect the health and safety of all who enter.  We have visitor restrictions in place and ask that people do not visit our facilities.  While we recognize that this can be disappointing to our visitors and patients, the health of you and our community is our top priority.  Obstetrical patients will be allowed 1 visitor during their stay.  We implemented social distancing guidelines, especially in public areas like our lobbies and waiting areas.  Our associates are following Personal Protective Equipment (PPE) guidelines including wearing masks in all settings and wearing additional PPE on units where appropriate.  All patients are masked on entry to the facility, temperature is obtained, and are asked to remain masked. Patients must also use hand  on entry to the facility.   [facility] We will be testing for all OB patients (around 38 weeks gestation) on Wednesday and Thursdays from 4938-1827 am      You can also go to be tested on other mornings, if necessary.  Curbside testing in the [facility] parking lot (see map).  In addition to our normal cleanliness standards, environmental cleaning and disinfection procedures are being followed consistently and correctly according to CDC recommendations for the Novel Coronavirus (COVID-19).  Please pre register if you have not yet by going to [email or web address] and click on maternity pre-registration link or go to: [email or web address]     Maternity registration is a separate process, but necessary for the COVID-19 testing. We have curbside testing from 8:00 - 11:00 am Tuesday and Wednesday for OB patients, in the Medical Office Building (small) parking lot adjacent to [facility]. You may be tested at this location M-F mornings, if necessary. Patients are advised to self-quarantine after testing. There is no fee for the testing. Your results will take about 96 hours to be processed. You will be notified if you test is POSITIVE for COVID-19, as soon as possible. If able, [facility] will also notify you of negative results. If you are positive, you may still have a support person. <-------Testing is in the small parking lot on the far right side of this image.

## 2020-05-27 ENCOUNTER — HOSPITAL ENCOUNTER (OUTPATIENT)
Dept: PERINATAL CARE | Age: 30
Discharge: HOME | End: 2020-05-27
Attending: OBSTETRICS & GYNECOLOGY
Payer: COMMERCIAL

## 2020-05-27 PROCEDURE — 76816 OB US FOLLOW-UP PER FETUS: CPT | Performed by: OBSTETRICS & GYNECOLOGY

## 2020-05-27 PROCEDURE — 76819 FETAL BIOPHYS PROFIL W/O NST: CPT | Performed by: OBSTETRICS & GYNECOLOGY

## 2020-05-27 PROCEDURE — 76820 UMBILICAL ARTERY ECHO: CPT | Performed by: OBSTETRICS & GYNECOLOGY

## 2020-05-29 ENCOUNTER — PATIENT MESSAGE (OUTPATIENT)
Dept: OBGYN CLINIC | Age: 30
End: 2020-05-29

## 2020-06-01 ENCOUNTER — ROUTINE PRENATAL (OUTPATIENT)
Dept: OBGYN CLINIC | Age: 30
End: 2020-06-01

## 2020-06-01 ENCOUNTER — HOSPITAL ENCOUNTER (EMERGENCY)
Age: 30
Discharge: HOME | End: 2020-06-01
Attending: OBSTETRICS & GYNECOLOGY | Admitting: OBSTETRICS & GYNECOLOGY
Payer: COMMERCIAL

## 2020-06-01 VITALS
WEIGHT: 152 LBS | BODY MASS INDEX: 23.86 KG/M2 | SYSTOLIC BLOOD PRESSURE: 133 MMHG | DIASTOLIC BLOOD PRESSURE: 83 MMHG | HEIGHT: 67 IN

## 2020-06-01 VITALS
HEART RATE: 66 BPM | HEIGHT: 67 IN | BODY MASS INDEX: 23.86 KG/M2 | DIASTOLIC BLOOD PRESSURE: 73 MMHG | RESPIRATION RATE: 16 BRPM | WEIGHT: 152 LBS | SYSTOLIC BLOOD PRESSURE: 132 MMHG | TEMPERATURE: 98.2 F

## 2020-06-01 DIAGNOSIS — O10.919 ESSENTIAL HYPERTENSION IN PREGNANCY: Primary | ICD-10-CM

## 2020-06-01 DIAGNOSIS — R00.2 PALPITATION: ICD-10-CM

## 2020-06-01 LAB
ALBUMIN SERPL-MCNC: 2.4 G/DL (ref 3.5–5)
ALBUMIN/GLOB SERPL: 0.7 {RATIO} (ref 1.1–2.2)
ALP SERPL-CCNC: 160 U/L (ref 45–117)
ALT SERPL-CCNC: 20 U/L (ref 12–78)
ANION GAP SERPL CALC-SCNC: 6 MMOL/L (ref 5–15)
AST SERPL-CCNC: 26 U/L (ref 15–37)
BASOPHILS # BLD: 0 K/UL (ref 0–0.1)
BASOPHILS NFR BLD: 0 % (ref 0–1)
BILIRUB SERPL-MCNC: 0.5 MG/DL (ref 0.2–1)
BUN SERPL-MCNC: 5 MG/DL (ref 6–20)
BUN/CREAT SERPL: 8 (ref 12–20)
CALCIUM SERPL-MCNC: 8 MG/DL (ref 8.5–10.1)
CHLORIDE SERPL-SCNC: 110 MMOL/L (ref 97–108)
CO2 SERPL-SCNC: 22 MMOL/L (ref 21–32)
CREAT SERPL-MCNC: 0.64 MG/DL (ref 0.55–1.02)
CREAT UR-MCNC: 76 MG/DL
DIFFERENTIAL METHOD BLD: ABNORMAL
EOSINOPHIL # BLD: 0.2 K/UL (ref 0–0.4)
EOSINOPHIL NFR BLD: 2 % (ref 0–7)
ERYTHROCYTE [DISTWIDTH] IN BLOOD BY AUTOMATED COUNT: 12.1 % (ref 11.5–14.5)
GLOBULIN SER CALC-MCNC: 3.3 G/DL (ref 2–4)
GLUCOSE SERPL-MCNC: 98 MG/DL (ref 65–100)
HCT VFR BLD AUTO: 31.8 % (ref 35–47)
HGB BLD-MCNC: 10.8 G/DL (ref 11.5–16)
IMM GRANULOCYTES # BLD AUTO: 0.1 K/UL (ref 0–0.04)
IMM GRANULOCYTES NFR BLD AUTO: 1 % (ref 0–0.5)
LYMPHOCYTES # BLD: 1.4 K/UL (ref 0.8–3.5)
LYMPHOCYTES NFR BLD: 16 % (ref 12–49)
MCH RBC QN AUTO: 29.2 PG (ref 26–34)
MCHC RBC AUTO-ENTMCNC: 34 G/DL (ref 30–36.5)
MCV RBC AUTO: 85.9 FL (ref 80–99)
MONOCYTES # BLD: 0.8 K/UL (ref 0–1)
MONOCYTES NFR BLD: 9 % (ref 5–13)
NEUTS SEG # BLD: 6.2 K/UL (ref 1.8–8)
NEUTS SEG NFR BLD: 72 % (ref 32–75)
NRBC # BLD: 0 K/UL (ref 0–0.01)
NRBC BLD-RTO: 0 PER 100 WBC
PLATELET # BLD AUTO: 114 K/UL (ref 150–400)
PLATELET # BLD AUTO: ABNORMAL K/UL (ref 150–400)
PMV BLD AUTO: ABNORMAL FL (ref 8.9–12.9)
POTASSIUM SERPL-SCNC: 3.9 MMOL/L (ref 3.5–5.1)
PROT SERPL-MCNC: 5.7 G/DL (ref 6.4–8.2)
PROT UR-MCNC: 19 MG/DL (ref 0–11.9)
PROT/CREAT UR-RTO: 0.3
RBC # BLD AUTO: 3.7 M/UL (ref 3.8–5.2)
RBC MORPH BLD: ABNORMAL
SODIUM SERPL-SCNC: 138 MMOL/L (ref 136–145)
WBC # BLD AUTO: 8.7 K/UL (ref 3.6–11)

## 2020-06-01 PROCEDURE — 85049 AUTOMATED PLATELET COUNT: CPT

## 2020-06-01 PROCEDURE — 80053 COMPREHEN METABOLIC PANEL: CPT

## 2020-06-01 PROCEDURE — 59025 FETAL NON-STRESS TEST: CPT

## 2020-06-01 PROCEDURE — 99284 EMERGENCY DEPT VISIT MOD MDM: CPT

## 2020-06-01 PROCEDURE — 36415 COLL VENOUS BLD VENIPUNCTURE: CPT

## 2020-06-01 PROCEDURE — 85025 COMPLETE CBC W/AUTO DIFF WBC: CPT

## 2020-06-01 PROCEDURE — 84156 ASSAY OF PROTEIN URINE: CPT

## 2020-06-01 RX ORDER — ACETAMINOPHEN 325 MG/1
650 TABLET ORAL
Status: DISCONTINUED | OUTPATIENT
Start: 2020-06-01 | End: 2020-06-01 | Stop reason: HOSPADM

## 2020-06-01 NOTE — Clinical Note
Please place cardiology consult for palpitations, SOB and pregnancy Pt should have an appt this THUR (in office)

## 2020-06-01 NOTE — H&P
History & Physical    Name: Chika Posey MRN: [id number]  SSN: [id number]    YOB: 1990  Age: 26 y.o. Sex: female        Subjective:     Estimated Date of Delivery: 20  OB History        1    Para   0    Term   0       0    AB   0    Living           SAB   0    TAB   0    Ectopic   0    Molar   0    Multiple        Live Births                    Ms. Xiao Valladares is admitted with pregnancy at 36w1d for [facility] and elevated BP at home with HA, le swelling r>l improved. no vision changes/ruq pain. +sob with palpitations. She had a cardiology eval in past (distant) work up wnl   Prenatal course was complicated by [facility]. Please see prenatal records for details. Past Medical History:   Diagnosis Date    Herniation of intervertebral disc of thoracolumbar region     Hypertension     Pap smear for cervical cancer screening 2019    negative, HPV negative    TMJ condylar resorption      Past Surgical History:   Procedure Laterality Date    HX APPENDECTOMY      HX APPENDECTOMY      HX OTHER SURGICAL      HX TONSIL AND ADENOIDECTOMY      HX TONSILLECTOMY      HX WISDOM TEETH EXTRACTION       Social History     Occupational History    Not on file   Tobacco Use    Smoking status: Never Smoker    Smokeless tobacco: Never Used   Substance and Sexual Activity    Alcohol use: Not Currently     Frequency: Never    Drug use: Never    Sexual activity: Yes     Partners: Male     Birth control/protection: None     Family History   Problem Relation Age of Onset    Hypertension Mother     Hypertension Father        Allergies   Allergen Reactions    Codeine Nausea and Vomiting     Prior to Admission medications    Medication Sig Start Date End Date Taking? Authorizing Provider   albuterol (PROVENTIL HFA, VENTOLIN HFA, PROAIR HFA) 90 mcg/actuation inhaler Take 2 Puffs by inhalation every four (4) hours as needed for Wheezing.    Yes Provider, Historical   methyldopa (ALDOMET) 250 mg tablet Take 1 Tab by mouth three (3) times daily. 3/10/20  Yes Beba Juarez MD   PNV DQ.53/UTLKBRD fum/folic ac (PRENATAL PO) Take 1 Tab by mouth daily. Yes Provider, Historical        There are no active hospital problems to display for this patient. Review of Systems: A comprehensive review of systems was negative except for that written in the HPI. Constitutional: negative for fevers, chills and weight loss  ENT ROS: negative for - hearing change, oral lesions or visual changes  Respiratory: see HPI  Cardiovascular: see hpi, palpitations  Gastrointestinal: negative for dysphagia, nausea and vomiting  Genito-Urinary ROS: see HPI  Inteument/breast: negative for rash, breast lump and nipple discharge  Musculoskeletal:negative for stiff joints, neck pain and muscle weakness  Endocrine ROS: negative for - breast changes, galactorrhea or temperature intolerance  Hematological and Lymphatic ROS: negative for - bruising or swollen lymph nodes        Objective:     Vitals:  Vitals:    06/01/20 1438 06/01/20 1440 06/01/20 1452 06/01/20 1507   BP: 126/71  120/73 130/70   Pulse: 75  84 76   Resp:   16    Temp:   98.2 °F (36.8 °C)    Weight:  152 lb (68.9 kg)     Height:  5' 7\" (1.702 m)          Patient Vitals for the past 24 hrs:   Temp Pulse Resp BP   06/01/20 1507  76  130/70   06/01/20 1452 98.2 °F (36.8 °C) 84 16 120/73   06/01/20 1438  75  126/71         Physical Exam: exam from office visit same day  Patient without distress.   Heart: Regular rate and rhythm or S1S2 present  Lung: clear to auscultation throughout lung fields, no wheezes, no rales, no rhonchi and normal respiratory effort  Abdomen: soft, nontender  Fundus: soft and non tender  Cervical Exam: no checked   Lower Extremities: no c/t/e, 1+dtr    Membranes:  Intact  Fetal Heart Rate: Baseline: 130 per minute  Variability: moderate  Accelerations: yes  Decelerations: none  TOCO: None    Prenatal Labs:   Lab Results   Component Value Date/Time    Rubella, External 7.68 2019    HBsAg, External negative 2019    HIV, External non-reactive 2019    Gonorrhea, External negative 2019    Chlamydia, External negative 2019        WBC   Date Value Ref Range Status   2020 8.7 3.6 - 11.0 K/uL Final     RBC   Date Value Ref Range Status   2020 3.70 (L) 3.80 - 5.20 M/uL Final     HGB   Date Value Ref Range Status   2020 10.8 (L) 11.5 - 16.0 g/dL Final     HCT   Date Value Ref Range Status   2020 31.8 (L) 35.0 - 47.0 % Final     PLATELET   Date Value Ref Range Status   2020 PENDING K/uL Incomplete     Hgb, External   Date Value Ref Range Status   2020 11.7  Final     Hct, External   Date Value Ref Range Status   2020 36.8  Final         Assessment/Plan:   26 y.o.  36w1d  CHTN  ro PIH: BP ok   Palpitations HR stable on pulse ox  The patient does have anemia  GBS unknown   Reassuring fetal surveillance    Plan: Admit for pih labs, pr /ur cr ratio. CEFM/TOCO  PIH precautions  Continue aldomet  Keep MFM fu and NUBIA fu thur  Serial BP  Consider out pt cardiology fu, avoid caffeine, iron replacement for anemia     Signed By:  Matthias Kuo MD     2020       NST Inpatient Procedure Note    Lowella Single presents for fetal non-stress test.    Indication is CHTN. She is 36w1d. She has been monitored for more than 60 minutes. The FHR was reactive. NST Interpretation:   FHR baseline 130 bpm,   Variability moderate  Accelerations present. Decelerations Absent. Uterine contractions were absent     Assessment  NST is reactive. NST is reassuring. Patient does need admission/observation for further monitoring. Torrie Horton was informed of the NST results and her questions were answered.     Plan:    [x] Continue admission to labor and delivery    [] Continue observation   [] Keep routine OB follow up upon discharge   [] Reviewed fetal movement kick counts, notify MD if decreased   []    []

## 2020-06-01 NOTE — PROGRESS NOTES
1430-Pt arrived to unit sent from office by Dr. Katiuska Huitron for pre-e workup. 1514-Dr. Esparza at bedside to see pt and discuss POC.  1654-Dr. Esparza called, MD update on pt BP's, and lab results. New orders received to discharge pt to home to keep scheduled appointment on Thursday with M and Dr. Katiuska Huitron. Pt to start 24hr urine tomorrow to return Wednesday or start on Wednesday to bring to office with her on Thursday. No other orders at this time. 1716-Discharge instructions reviewed with pt at this time. Pt instructed to keep follow up appointment on Thursday and to complete 24hr urine and return that on Wednesday or Thursday at her appointment. Pt given supplies to complete 24hr urine and given instructions on how to complete it. Pt also advised to be on modified bedrest until her appointment on Thursday. Pt verbalized understanding and has no questions or concerns at this time. 1718-Pt discharged to home, ambulated off unit in stable condition.

## 2020-06-01 NOTE — PROGRESS NOTES
[facility]  [email or web address]  [phone]    Chana Lynch MD, FACOG       OB/GYN: [facility] Problem visit    Chief Complaint:   Chief Complaint   Patient presents with    Hypertension       Patient Active Problem List    Diagnosis    Prenatal care of primigravida, antepartum    Essential hypertension     HCTZ prior to IUP      Essential hypertension in pregnancy    Prenatal care     Hypertension UR SAMPLE EACH VISIT. FS @ [facility] for hydrochlorothiazide exposure. CHTN - scheduled 02/10/2020 @ 9:30am at [facility]  Baseline 24 hr urine  Flu shot 19  NIPTS low risk 19  Gender XX, ant plac  19 - hgb 14.3, CR0.60, AST 20, ALT 14, UR 24 hr protein ? Platelets clumped- needs drawn at next visit, will do AFP  24hr protein #168  AFP low risk - 2020  CBC - platelets were cancelled again due to aggregation - 2020  Glucola/cbc/tsh  CF/SMA WNL - 2019  IOL 20 @5:30AM. Patient is NOT aware. Plat: unable to run: rec plat RESUBMIT SODIUM CITRATE (BLUE) AND EDTA (LAVENDER) TUBES FOR HEMATOLOGICAL TESTING.  TSH:0.86 - 2020  Creat:0.56, AST:15, ALT:16, plate: unable to complete. 1 hr gtt normal - 2020  Next [facility] appt at [address] [place] [address] - 2020  Labs drawn 2020  hgb 11.4  Plat unable to run lab/ draw at Next office visit  Cr 0.60, ast 22, Alt 17  Urine 24hour protein 193  NEEDS SPECIAL TUBE FOR PLATELETS           History of Present Illness: The patient is a 26 y.o.  female who reports having elevated blood pressure readings and SOB for 3 days. Also c/o headache and palpitations, frequent but random     This is not a new problem. This is not a routinely scheduled OB appointment. She reports the symptoms are has worsened. Aggravating factors include laying on her left side. Alleviating factors include none. She does not have other concerns.     PFSH:  Past Medical History:   Diagnosis Date    Herniation of intervertebral disc of thoracolumbar region     Hypertension     Pap smear for cervical cancer screening 12/02/2019    negative, HPV negative    TMJ condylar resorption      Past Surgical History:   Procedure Laterality Date    HX APPENDECTOMY      HX APPENDECTOMY  2000    HX OTHER SURGICAL      HX TONSIL AND ADENOIDECTOMY      HX TONSILLECTOMY  2000    HX WISDOM TEETH EXTRACTION       Family History   Problem Relation Age of Onset    Hypertension Mother     Hypertension Father      Social History     Tobacco Use    Smoking status: Never Smoker    Smokeless tobacco: Never Used   Substance Use Topics    Alcohol use: Not Currently     Frequency: Never    Drug use: Never     Allergies   Allergen Reactions    Codeine Nausea and Vomiting     Current Outpatient Medications   Medication Sig    albuterol (PROVENTIL HFA, VENTOLIN HFA, PROAIR HFA) 90 mcg/actuation inhaler Take 2 Puffs by inhalation every four (4) hours as needed for Wheezing.  methyldopa (ALDOMET) 250 mg tablet Take 1 Tab by mouth three (3) times daily.  PNV WV.85/JDHUIHB fum/folic ac (PRENATAL PO) Take 1 Tab by mouth daily. No current facility-administered medications for this visit.         Review of Systems:  History obtained from the patient and written ROS questionnaire  Constitutional: negative for fevers, chills and weight loss  ENT ROS: negative for - hearing change, oral lesions or visual changes  Respiratory: negative for cough, wheezing or dyspnea on exertion  Cardiovascular: see HPI  Gastrointestinal: see HPI  Genito-Urinary ROS: see HPI  Inteument/breast: negative for rash, breast lump and nipple discharge  Musculoskeletal:negative for stiff joints, neck pain and muscle weakness  Endocrine ROS: negative for - breast changes, galactorrhea or temperature intolerance  Hematological and Lymphatic ROS: negative for - blood clots, bruising or swollen lymph nodes    Physical Exam:  Visit Vitals  /83 (BP 1 Location: Left arm, BP Patient Position: Sitting) Ht 5' 7\" (1.702 m)   Wt 152 lb (68.9 kg)   BMI 23.81 kg/m²       GENERAL: alert, well appearing, and in no distress  See exam from L and D H and P  NEURO: alert, oriented, normal speech    See PN flowsheet for additional notes and exam    Assessment:  26 y.o.  36w1d   Encounter Diagnoses   Name Primary?  Essential hypertension in pregnancy Yes    Palpitation        Plan:  An evaluation of this patient's concern is planned. The patient is advised that she should contact the office if she does not note improvement or if symptoms recur  She should contact our office with any questions or concerns  She could keep her routine OB appointment. PIH precautions  To L and D for pih labs serial BP, urine, nst    No orders of the defined types were placed in this encounter. No results found for this visit on 20.     Claude Comber, MD

## 2020-06-01 NOTE — TELEPHONE ENCOUNTER
Patient requested visit for tomorrow 6/2/20 and was given work in time of 11:10 per [employer]. Patient has been advised of scheduling.

## 2020-06-01 NOTE — DISCHARGE INSTRUCTIONS
Patient Education   Patient Education   Patient Education   Patient Education   KEEP SCHEDULED APPOINTMENT ON Thursday 6/4/2020 WITH DR. BRUNSON AND MATERNAL FETAL MEDICINE. START 24HR URINE EITHER Tuesday OR Wednesday AND RETURN TO OFFICE THE FOLLOWING DAY. CALL MD FOR ANY QUESTIONS OR CONCERNS BEFORE NEXT APPOINTMENT. Preeclampsia: Care Instructions  Overview     Preeclampsia occurs when a woman's blood pressure rises during pregnancy. Often with preeclampsia, you also have swelling in your legs, hands, and face. A test may show too much protein in your urine. Preeclampsia is also called toxemia. If preeclampsia is severe and not treated, it can lead to seizures (eclampsia) and damage to your liver or kidneys. Preeclampsia can prevent your baby from getting enough food and oxygen. This can cause a low birth weight or other problems. Your doctor will watch you closely to prevent these problems. He or she also may recommend that you reduce your activity. If your preeclampsia is a danger to your health or the health of your baby, your doctor may need to deliver your baby early. While preeclampsia is a concern, most women with preeclampsia have healthy babies. After a woman gives birth, preeclampsia usually goes away on its own. But symptoms may last a few weeks or more and can get worse after delivery. Rarely, symptoms of preeclampsia don't show up until days or even weeks after childbirth. Follow-up care is a key part of your treatment and safety. Be sure to make and go to all appointments, and call your doctor if you are having problems. It's also a good idea to know your test results and keep a list of the medicines you take. How can you care for yourself at home? · Take and record your blood pressure at home if your doctor tells you to. ? Learn the importance of the two measures of blood pressure (such as 120 over 80, or 120/80).  The first number is the systolic pressure, which is the force of blood on the artery walls as the heart pumps. The second number is the diastolic pressure, which is the force of blood on the artery walls between heartbeats, when the heart is at rest. You have a choice of monitors to use. ? Manual monitor: You pump up the cuff and use a stethoscope to listen for your pulse. ? Electronic monitor: The cuff inflates, and a gauge shows your pulse rate. ? To take your blood pressure:  ? Ask your doctor to check your blood pressure monitor to be sure that it is accurate and that the cuff fits you. Also ask your doctor to watch you to make sure that you are using it right. ? You should not eat, use tobacco products, or use medicine known to raise blood pressure (such as some nasal decongestant sprays) before you take your blood pressure. ? Avoid taking your blood pressure if you have just exercised. Also avoid taking it if you are nervous or upset. Rest at least 15 minutes before you take your blood pressure. · If your doctor advises, check the protein levels in your urine. Your doctor or nurse will show you how to do this. · Take your medicines exactly as prescribed. Call your doctor if you think you are having a problem with your medicine. · Do not smoke. Quitting smoking will help improve your baby's growth and health. If you need help quitting, talk to your doctor about stop-smoking programs and medicines. These can increase your chances of quitting for good. · Eat a balanced and healthy diet that has lots of fruits and vegetables. · If your doctor advised bed rest, be sure to stay off your feet and rest as much as possible. ? Keep a phone, phone book, notepad, and pen near the bed where you can easily reach them. ? Gently stretch your legs every hour to maintain good blood flow. ? Have another family member pack snacks and lunch food in a cooler close to your bed. ?  Use this time for activities that you usually cannot find time for, such as reading, craft projects, or letter writing. · You can keep track of your baby's health by noting the length of time it takes to count 10 movements (such as kicks, flutters, or rolls). Feeling 10 movements in less than 1 hour is considered normal. Track your baby's movements once each day. Bring this record with you to each prenatal visit. When should you call for help? Call 911 anytime you think you may need emergency care. For example, call if:  · You passed out (lost consciousness). · You have a seizure. Call your doctor now or seek immediate medical care if:  · You have symptoms of preeclampsia, such as:  ? Sudden swelling of your face, hands, or feet. ? New vision problems (such as dimness, blurring, or seeing spots). ? A severe headache. · Your blood pressure is higher than it should be, or it rises suddenly. · You have new nausea or vomiting. · You think that you are in labor. · You have pain in your belly or pelvis. Watch closely for changes in your health, and be sure to contact your doctor if:  · You gain weight rapidly. Where can you learn more? Go to http://[email or web address].info/  Enter Z954 in the search box to learn more about \"Preeclampsia: Care Instructions. \"  Current as of: February 11, 2020               Content Version: 12.5  © 8607-3341 [employer]. Care instructions adapted under license by [employer] (which disclaims liability or warranty for this information). If you have questions about a medical condition or this instruction, always ask your healthcare professional. [place] [address] any warranty or liability for your use of this information. Weeks 34 to 36 of Your Pregnancy: Care Instructions  Your Care Instructions    By now, your baby and your belly have grown quite large. It is almost time to give birth. Your baby's lungs are almost ready to breathe air.  The bones in your baby's head are now firm enough to protect it, but soft enough to move down through the birth canal.  You may feel excited, happy, anxious, or scared. You may wonder how you will know if you are in labor or what to expect during labor. Try to be flexible in your expectations of the birth. Because each birth is different, there is no way to know exactly what childbirth will be like for you. This care sheet will help you know what to expect and how to prepare. This may make your childbirth easier. If you haven't already had the Tdap shot during this pregnancy, talk to your doctor about getting it. It will help protect your  against pertussis infection. In the 36th week, most women have a test for group B streptococcus (GBS). GBS is a common bacteria that can live in the vagina and rectum. It can make your baby sick after birth. If you test positive, you will get antibiotics during labor. The medicine will keep your baby from getting the bacteria. Follow-up care is a key part of your treatment and safety. Be sure to make and go to all appointments, and call your doctor if you are having problems. It's also a good idea to know your test results and keep a list of the medicines you take. How can you care for yourself at home? Learn about pain relief choices  · Pain is different for every woman. Talk with your doctor about your feelings about pain. · You can choose from several types of pain relief. These include medicine or breathing techniques, as well as comfort measures. You can use more than one option. · If you choose to have pain medicine during labor, talk to your doctor about your options. Some medicines lower anxiety and help with some of the pain. Others make your lower body numb so that you won't feel pain. · Be sure to tell your doctor about your pain medicine choice before you start labor or very early in your labor. You may be able to change your mind as labor progresses. · Rarely, a woman is put to sleep by medicine given through a mask or an IV.   Labor and delivery  · The first stage of labor has three parts: early, active, and transition. ? Most women have early labor at home. You can stay busy or rest, eat light snacks, drink clear fluids, and start counting contractions. ? When talking during a contraction gets hard, you may be moving to active labor. During active labor, you should head for the hospital if you are not there already. ? You are in active labor when contractions come every 3 to 4 minutes and last about 60 seconds. Your cervix is opening more rapidly. ? If your water breaks, contractions will come faster and stronger. ? During transition, your cervix is stretching, and contractions are coming more rapidly. ? You may want to push, but your cervix might not be ready. Your doctor will tell you when to push. · The second stage starts when your cervix is completely opened and you are ready to push. ? Contractions are very strong to push the baby down the birth canal.  ? You will feel the urge to push. You may feel like you need to have a bowel movement. ? You may be coached to push with contractions. These contractions will be very strong, but you will not have them as often. You can get a little rest between contractions. ? You may be emotional and irritable. You may not be aware of what is going on around you.  ? One last push, and your baby is born. · The third stage is when a few more contractions push out the placenta. This may take 30 minutes or less. · The fourth stage is the welcome recovery. You may feel overwhelmed with emotions and exhausted but alert. This is a good time to start breastfeeding. Where can you learn more? Go to http://sherlyn-lei.info/  Enter B912 in the search box to learn more about \"Weeks 34 to 36 of Your Pregnancy: Care Instructions. \"  Current as of: May 29, 2019Content Version: 12.4  © 4828-5653 Healthwise[employer].   Care instructions adapted under license by [employer] (which disclaims liability or warranty for this information). If you have questions about a medical condition or this instruction, always ask your healthcare professional. [address] any warranty or liability for your use of this information. Counting Your Baby's Kicks: Care Instructions  Your Care Instructions     Counting your baby's kicks is one way your doctor can tell that your baby is healthy. Most women--especially in a first pregnancy--feel their baby move for the first time between 16 and 22 weeks. The movement may feel like flutters rather than kicks. Your baby may move more at certain times of the day. When you are active, you may notice less kicking than when you are resting. At your prenatal visits, your doctor will ask whether the baby is active. In your last trimester, your doctor may ask you to count the number of times you feel your baby move. Follow-up care is a key part of your treatment and safety. Be sure to make and go to all appointments, and call your doctor if you are having problems. It's also a good idea to know your test results and keep a list of the medicines you take. How do you count fetal kicks? · A common method of checking your baby's movement is to count the number of kicks or moves you feel in 1 hour. Ten movements (such as kicks, flutters, or rolls) in 1 hour are normal. Some doctors suggest that you count in the morning until you get to 10 movements. Then you can quit for that day and start again the next day. · Pick your baby's most active time of day to count. This may be any time from morning to evening. · If you do not feel 10 movements in an hour, your baby may be sleeping. Wait for the next hour and count again. When should you call for help?    Call your doctor now or seek immediate medical care if:  · You noticed that your baby has stopped moving or is moving much less than normal.  Watch closely for changes in your health, and be sure to contact your doctor if you have any problems. Where can you learn more? Go to http://sherlyn-lei.info/  Enter [id number] in the search box to learn more about \"Counting Your Baby's Kicks: Care Instructions. \"  Current as of: 2020               Content Version: 12.5   [employer]. Care instructions adapted under license by [employer] (which disclaims liability or warranty for this information). If you have questions about a medical condition or this instruction, always ask your healthcare professional. [place] [address] any warranty or liability for your use of this information. Pregnancy Precautions: Care Instructions  Your Care Instructions     There is no sure way to prevent labor before your due date ( labor) or to prevent most other pregnancy problems. But there are things you can do to increase your chances of a healthy pregnancy. Go to your appointments, follow your doctor's advice, and take good care of yourself. Eat well, and exercise (if your doctor agrees). And make sure to drink plenty of water. Follow-up care is a key part of your treatment and safety. Be sure to make and go to all appointments, and call your doctor if you are having problems. It's also a good idea to know your test results and keep a list of the medicines you take. How can you care for yourself at home? · Make sure you go to your prenatal appointments. At each visit, your doctor will check your blood pressure. Your doctor will also check to see if you have protein in your urine. High blood pressure and protein in urine are signs of preeclampsia. This condition can be dangerous for you and your baby. · Drink plenty of fluids, enough so that your urine is light yellow or clear like water. Dehydration can cause contractions.  If you have kidney, heart, or liver disease and have to limit fluids, talk with your doctor before you increase the amount of fluids you drink. · Tell your doctor right away if you notice any symptoms of an infection, such as:  ? Burning when you urinate. ? A foul-smelling discharge from your vagina. ? Vaginal itching. ? Unexplained fever. ? Unusual pain or soreness in your uterus or lower belly. · Eat a balanced diet. Include plenty of foods that are high in calcium and iron. ? Foods high in calcium include milk, cheese, yogurt, almonds, and broccoli. ? Foods high in iron include red meat, shellfish, poultry, eggs, beans, raisins, whole-grain bread, and leafy green vegetables. · Do not smoke. If you need help quitting, talk to your doctor about stop-smoking programs and medicines. These can increase your chances of quitting for good. · Do not drink alcohol or use illegal drugs. · Follow your doctor's directions about activity. Your doctor will let you know how much, if any, exercise you can do. · Ask your doctor if you can have sex. If you are at risk for early labor, your doctor may ask you to not have sex. · Take care to prevent falls. During pregnancy, your joints are loose, and your balance is off. Sports such as bicycling, skiing, or in-line skating can increase your risk of falling. And don't ride horses or motorcycles, dive, water ski, scuba dive, or parachute jump while you are pregnant. · Avoid getting very hot. Do not use saunas or hot tubs. Avoid staying out in the sun in hot weather for long periods. Take acetaminophen (Tylenol) to lower a high fever. · Do not take any over-the-counter or herbal medicines or supplements without talking to your doctor or pharmacist first.  When should you call for help? Call 911 anytime you think you may need emergency care. For example, call if:  · You passed out (lost consciousness). · You have a seizure. · You have severe vaginal bleeding. · You have severe pain in your belly or pelvis.   · You have had fluid gushing or leaking from your vagina and you know or think the umbilical cord is bulging into your vagina. If this happens, immediately get down on your knees so your rear end (buttocks) is higher than your head. This will decrease the pressure on the cord until help arrives. Call your doctor now or seek immediate medical care if:  · You have signs of preeclampsia, such as:  ? Sudden swelling of your face, hands, or feet. ? New vision problems (such as dimness, blurring, or seeing spots). ? A severe headache. · You have any vaginal bleeding. · You have belly pain or cramping. · You have a fever. · You have had regular contractions (with or without pain) for an hour. This means that you have 8 or more within 1 hour or 4 or more in 20 minutes after you change your position and drink fluids. · You have a sudden release of fluid from your vagina. · You have low back pain or pelvic pressure that does not go away. · You notice that your baby has stopped moving or is moving much less than normal.  Watch closely for changes in your health, and be sure to contact your doctor if you have any problems. Where can you learn more? Go to http://sherlyn-lei.info/  Enter Y951 in the search box to learn more about \"Pregnancy Precautions: Care Instructions. \"  Current as of: February 11, 2020               Content Version: 12.5  © 3871-6627 Healthwise, Incorporated. Care instructions adapted under license by [employer] (which disclaims liability or warranty for this information). If you have questions about a medical condition or this instruction, always ask your healthcare professional. [place] [address] any warranty or liability for your use of this information.

## 2020-06-03 NOTE — PATIENT INSTRUCTIONS
Group B Strep During Pregnancy: Care Instructions Your Care Instructions Group B strep infection is caused by a type of bacteria. It's a different kind of bacteria than the kind that causes strep throat. You may have this kind of bacteria in your body. Sometimes it may cause an infection, but most of the time it doesn't make you sick or cause symptoms. But if you pass the bacteria to your baby during the birth, it can cause serious health problems for your baby. If you have this bacteria in your body, you will get antibiotics when you are in labor. Antibiotics help prevent problems for a  baby. After birth, doctors will watch and may test your baby. If your baby tests positive for Group B strep, he or she will get antibiotics. If you plan to breastfeed your baby, don't worry. It will be safe to breastfeed. Follow-up care is a key part of your treatment and safety. Be sure to make and go to all appointments, and call your doctor if you are having problems. It's also a good idea to know your test results and keep a list of the medicines you take. How can you care for yourself at home? · If your doctor has prescribed antibiotics, take them as directed. Do not stop taking them just because you feel better. You need to take the full course of antibiotics. · Tell your doctor if you are allergic to any antibiotic. · If your water breaks, go to the hospital right away. Your doctor will give you antibiotics to help protect your baby from infection. · Tell the doctors and nurses at the hospital that you tested positive for group B strep. When should you call for help? Call your doctor now or seek immediate medical care if: 
· You have symptoms of a urinary tract infection. These may include: 
? Pain or burning when you urinate. ? A frequent need to urinate without being able to pass much urine.  
? Pain in the flank, which is just below the rib cage and above the waist on either side of the back. ? Blood in your urine. ? A fever. · You think your water has broken. · You have pain in your belly or pelvis. Watch closely for changes in your health, and be sure to contact your doctor if you have any problems. Where can you learn more? Go to http://sherlyn-lei.info/ Enter M001 in the search box to learn more about \"Group B Strep During Pregnancy: Care Instructions. \" Current as of: February 11, 2020               Content Version: 12.5 © 8250-7120 [employer]. Care instructions adapted under license by [employer] (which disclaims liability or warranty for this information). If you have questions about a medical condition or this instruction, always ask your healthcare professional. [address] any warranty or liability for your use of this information.

## 2020-06-04 ENCOUNTER — HOSPITAL ENCOUNTER (OUTPATIENT)
Dept: PERINATAL CARE | Age: 30
Discharge: HOME | End: 2020-06-04
Attending: OBSTETRICS & GYNECOLOGY
Payer: COMMERCIAL

## 2020-06-04 ENCOUNTER — ROUTINE PRENATAL (OUTPATIENT)
Dept: OBGYN CLINIC | Age: 30
End: 2020-06-04

## 2020-06-04 VITALS
SYSTOLIC BLOOD PRESSURE: 133 MMHG | BODY MASS INDEX: 24.11 KG/M2 | WEIGHT: 153.6 LBS | HEIGHT: 67 IN | DIASTOLIC BLOOD PRESSURE: 78 MMHG

## 2020-06-04 DIAGNOSIS — R00.2 PALPITATION: ICD-10-CM

## 2020-06-04 DIAGNOSIS — Z34.00 PRENATAL CARE OF PRIMIGRAVIDA, ANTEPARTUM: Primary | ICD-10-CM

## 2020-06-04 LAB — GRBS, EXTERNAL: NEGATIVE

## 2020-06-04 PROCEDURE — 76819 FETAL BIOPHYS PROFIL W/O NST: CPT | Performed by: OBSTETRICS & GYNECOLOGY

## 2020-06-04 NOTE — PROGRESS NOTES
[facility]  [email or web address]  [phone]    Sanam Proctor MD, FACOG     Follow-up OB visit    Chief Complaint   Patient presents with   Alexis.Bran Routine Prenatal Visit       Patient Active Problem List    Diagnosis Date Noted    Prenatal care of primigravida, antepartum 05/04/2020    Essential hypertension 04/11/2020    Essential hypertension in pregnancy 01/27/2020    Prenatal care 12/02/2019        The patient reports the following concerns: none. Had [facility] visit this am. COVID information given. GBS today. Vitals:    06/04/20 0922   BP: 133/78   Weight: 153 lb 9.6 oz (69.7 kg)   Height: 5' 7\" (1.702 m)     See PN flowsheet for exam    26 y.o. Demetrius Ambrocio 36w4d   Encounter Diagnoses   Name Primary?  Palpitation     Prenatal care of primigravida, antepartum Yes        [] SAB/bleeding precautions reviewed   [x] PTL/PPROM precautions reviewed   [] Labor precautions reviewed   [] Fetal kick counts discussed   [] Labs reviewed with patient   [] Geremias Salvage precautions reviewed   [] Consent reviewed   [] Handouts given to pt   [] Glucola handout    [] GBS/labor/Magic Hour handout   []    [] We reviewed CDC recommendations for Tdap for patient and close contacts and RBA of receiving in pregnancy, advised obtaining in third trimester   [] Reviewed healthy nutrition in pregnancy and good exercise practices   [] We disc safer medications in pregnancy and referred patient to [facility] NUBIA resources   [] We reviewed CDC recommendations for flu vaccine and RBA of receiving in pregnancy   []    []    []       Follow-up and Dispositions    · Return in about 1 week (around 6/11/2020) for Follow up OB visit.          Orders Placed This Encounter    CULTURE, GENITAL GROUP B STREP    PROTEIN, URINE, 24 HR       Sanam Proctor MD

## 2020-06-05 LAB
PROT 24H UR-MRATE: 252 MG/24 HR (ref 30–150)
PROT UR-MCNC: 6.8 MG/DL

## 2020-06-05 NOTE — PROGRESS NOTES
Normal results, add to prenatal records. We can review in detail with patient at next visit.   Fu on 24 hr ur vidyaat

## 2020-06-08 LAB — B-HEM STREP SPEC QL CULT: NEGATIVE

## 2020-06-09 NOTE — PATIENT INSTRUCTIONS
Week 37 of Your Pregnancy: Care Instructions Your Care Instructions You are near the end of your pregnancyand you're probably pretty uncomfortable. It may be harder to walk around. Lying down probably isn't comfortable either. You may have trouble getting to sleep or staying asleep. Most women deliver their babies between 40 and 41 weeks. This is a good time to think about packing a bag for the hospital with items you'll need. Then you'll be ready when labor starts. Follow-up care is a key part of your treatment and safety. Be sure to make and go to all appointments, and call your doctor if you are having problems. It's also a good idea to know your test results and keep a list of the medicines you take. How can you care for yourself at home? Learn about breastfeeding · Breastfeeding is best for your baby and good for you. · Breast milk has antibodies to help your baby fight infections. · Mothers who breastfeed often lose weight faster, because making milk burns calories. · Learning the best ways to hold your baby will make breastfeeding easier. · Let your partner bathe and diaper the baby to keep your partner from feeling left out. Snuggle together when you breastfeed. · You may want to learn how to use a breast pump and store your milk. · If you choose to bottle feed, make the feeding feel like breastfeeding so you can bond with your baby. Always hold your baby and the bottle. Do not prop bottles or let your baby fall asleep with a bottle. Learn about crying · It is common for babies to cry for 1 to 3 hours a day. Some cry more, some cry less. · Babies don't cry to make you upset or because you are a bad parent. · Crying is how your baby communicates. Your baby may be hungry; have gas; need a diaper change; or feel cold, warm, tired, lonely, or tense. Sometimes babies cry for unknown reasons. · If you respond to your baby's needs, he or she will learn to trust you. · Try to stay calm when your baby cries. Your baby may get more upset if he or she senses that you are upset. Know how to care for your  · Your baby's umbilical cord stump will drop off on its own, usually between 1 and 2 weeks. To care for your baby's umbilical cord area: ? Clean the area at the bottom of the cord 2 or 3 times a day. ? Pay special attention to the area where the cord attaches to the skin. ? Keep the diaper folded below the cord. ? Use a damp washcloth or cotton ball to sponge bathe your baby until the stump has come off. · Your baby's first dark stool is called meconium. After the meconium is passed, your baby will develop his or her own bowel pattern. ? Some babies, especially  babies, have several bowel movements a day. Others have one or two a day, or one every 2 to 3 days. ?  babies often have loose, yellow stools. Formula-fed babies have more formed stools. ? If your baby's stools look like little pellets, he or she is constipated. After 2 days of constipation, call your baby's doctor. · If your baby will be circumcised, you can care for him at home. ? Gently rinse his penis with warm water after every diaper change. Do not try to remove the film that forms on the penis. This film will go away on its own. Pat dry. ? Put petroleum ointment, such as Vaseline, on the area of the diaper that will touch your baby's penis. This will keep the diaper from sticking to your baby. ? Ask the doctor about giving your baby acetaminophen (Tylenol) for pain. Where can you learn more? Go to http://sherlyn-lei.info/ Enter 68 21 [id number] in the search box to learn more about \"Week 37 of Your Pregnancy: Care Instructions. \" Current as of: 2020               Content Version: 12.5 © 3053-5899 [employer].   
Care instructions adapted under license by [employer] (which disclaims liability or warranty for this information). If you have questions about a medical condition or this instruction, always ask your healthcare professional. [address] any warranty or liability for your use of this information.

## 2020-06-10 ENCOUNTER — HOSPITAL ENCOUNTER (OUTPATIENT)
Dept: PERINATAL CARE | Age: 30
Discharge: HOME | End: 2020-06-10
Attending: OBSTETRICS & GYNECOLOGY
Payer: COMMERCIAL

## 2020-06-10 ENCOUNTER — HOSPITAL ENCOUNTER (OUTPATIENT)
Dept: LAB | Age: 30
Discharge: HOME | End: 2020-06-10
Payer: COMMERCIAL

## 2020-06-10 ENCOUNTER — ROUTINE PRENATAL (OUTPATIENT)
Dept: OBGYN CLINIC | Age: 30
End: 2020-06-10

## 2020-06-10 VITALS — DIASTOLIC BLOOD PRESSURE: 79 MMHG | WEIGHT: 156 LBS | BODY MASS INDEX: 24.43 KG/M2 | SYSTOLIC BLOOD PRESSURE: 141 MMHG

## 2020-06-10 DIAGNOSIS — O10.919 ESSENTIAL HYPERTENSION IN PREGNANCY: ICD-10-CM

## 2020-06-10 DIAGNOSIS — Z34.00 PRENATAL CARE OF PRIMIGRAVIDA, ANTEPARTUM: Primary | ICD-10-CM

## 2020-06-10 DIAGNOSIS — U07.1 ASYMPTOMATIC COVID-19 VIRUS INFECTION: ICD-10-CM

## 2020-06-10 PROCEDURE — 87635 SARS-COV-2 COVID-19 AMP PRB: CPT

## 2020-06-10 PROCEDURE — 76819 FETAL BIOPHYS PROFIL W/O NST: CPT | Performed by: OBSTETRICS & GYNECOLOGY

## 2020-06-10 PROCEDURE — 76820 UMBILICAL ARTERY ECHO: CPT | Performed by: OBSTETRICS & GYNECOLOGY

## 2020-06-10 NOTE — PROGRESS NOTES
_ [address] [facility]  [email or web address]  [phone]    Rossy Wilson MD, FACOG     Follow-up OB visit    Chief Complaint   Patient presents with   Sundar Motta Routine Prenatal Visit       Patient Active Problem List    Diagnosis Date Noted    Prenatal care of primigravida, antepartum 2020    Essential hypertension 2020    Essential hypertension in pregnancy 2020    Prenatal care 2019        The patient reports the following concerns: none, dizzy spell last night. Vitals:    06/10/20 0940   BP: 141/79   Weight: 156 lb (70.8 kg)     See PN flowsheet for exam    26 y.o.  37w3d   Encounter Diagnoses   Name Primary?  Prenatal care of primigravida, antepartum Yes    Essential hypertension in pregnancy      PIH precautions  PIH labs  Keep weekly MFM fu  [facility] BID  Notify MD for severe range BP  Rec taking BP meds q 8h  IOL ho    Current Outpatient Medications:     albuterol (PROVENTIL HFA, VENTOLIN HFA, PROAIR HFA) 90 mcg/actuation inhaler, Take 2 Puffs by inhalation every four (4) hours as needed for Wheezing., Disp: , Rfl:     methyldopa (ALDOMET) 250 mg tablet, Take 1 Tab by mouth three (3) times daily. , Disp: 90 Tab, Rfl: 3    PNV YL.77/HYSDDBI fum/folic ac (PRENATAL PO), Take 1 Tab by mouth daily. , Disp: , Rfl:     Plan IOL 39 wks or sooner for [facility]   [] SAB/bleeding precautions reviewed   [] PTL/PPROM precautions reviewed   [] Labor precautions reviewed   [] Fetal kick counts discussed   [] Labs reviewed with patient   [] Isamar Seals precautions reviewed   [] Consent reviewed   [] Handouts given to pt   [] Glucola handout    [] GBS/labor/Magic Hour handout   []    [] We reviewed CDC recommendations for Tdap for patient and close contacts and RBA of receiving in pregnancy, advised obtaining in third trimester   [] Reviewed healthy nutrition in pregnancy and good exercise practices   [] We disc safer medications in pregnancy and referred patient to [facility] resources   [] We reviewed CDC recommendations for flu vaccine and RBA of receiving in pregnancy   []    []    []           Orders Placed This Encounter    CBC W/O DIFF    METABOLIC PANEL, Maryana Randle MD

## 2020-06-11 LAB
ALBUMIN SERPL-MCNC: 3.5 G/DL (ref 3.8–4.8)
ALBUMIN/GLOB SERPL: 1.5 {RATIO} (ref 1.2–2.2)
ALP SERPL-CCNC: 184 IU/L (ref 39–117)
ALT SERPL-CCNC: 13 IU/L (ref 0–32)
AST SERPL-CCNC: 26 IU/L (ref 0–40)
BILIRUB SERPL-MCNC: 0.3 MG/DL (ref 0–1.2)
BUN SERPL-MCNC: 4 MG/DL (ref 6–20)
BUN/CREAT SERPL: 6 (ref 9–23)
CALCIUM SERPL-MCNC: 8.9 MG/DL (ref 8.7–10.2)
CHLORIDE SERPL-SCNC: 104 MMOL/L (ref 96–106)
CO2 SERPL-SCNC: 21 MMOL/L (ref 20–29)
CREAT SERPL-MCNC: 0.62 MG/DL (ref 0.57–1)
ERYTHROCYTE [DISTWIDTH] IN BLOOD BY AUTOMATED COUNT: 12.5 % (ref 11.7–15.4)
GLOBULIN SER CALC-MCNC: 2.4 G/DL (ref 1.5–4.5)
GLUCOSE SERPL-MCNC: 75 MG/DL (ref 65–99)
HCT VFR BLD AUTO: 35.1 % (ref 34–46.6)
HGB BLD-MCNC: 11.7 G/DL (ref 11.1–15.9)
MCH RBC QN AUTO: 28.1 PG (ref 26.6–33)
MCHC RBC AUTO-ENTMCNC: 33.3 G/DL (ref 31.5–35.7)
MCV RBC AUTO: 84 FL (ref 79–97)
MORPHOLOGY BLD-IMP: NORMAL
PLATELET # BLD AUTO: NORMAL X10E3/UL
POTASSIUM SERPL-SCNC: 4.6 MMOL/L (ref 3.5–5.2)
PROT SERPL-MCNC: 5.9 G/DL (ref 6–8.5)
RBC # BLD AUTO: 4.16 X10E6/UL (ref 3.77–5.28)
SARS-COV-2, COV2NT: NOT DETECTED
SODIUM SERPL-SCNC: 138 MMOL/L (ref 134–144)
WBC # BLD AUTO: 8.1 X10E3/UL (ref 3.4–10.8)

## 2020-06-12 NOTE — PROGRESS NOTES
Normal results, add to prenatal records. We can review in detail with patient at next visit.   Add to PL neg covid date

## 2020-06-12 NOTE — PROGRESS NOTES
Add PIH labs to PL (hgb,plat,cr, ast, alt, ur: prot 24 hr total or ratio) w date   Call lab FU on PLATELET count to make sure they are still working on it, since we did special order/tube.

## 2020-06-16 NOTE — PATIENT INSTRUCTIONS

## 2020-06-17 ENCOUNTER — HOSPITAL ENCOUNTER (OUTPATIENT)
Dept: PERINATAL CARE | Age: 30
Discharge: HOME | End: 2020-06-17
Attending: OBSTETRICS & GYNECOLOGY
Payer: COMMERCIAL

## 2020-06-17 ENCOUNTER — ROUTINE PRENATAL (OUTPATIENT)
Dept: OBGYN CLINIC | Age: 30
End: 2020-06-17

## 2020-06-17 VITALS
WEIGHT: 157 LBS | DIASTOLIC BLOOD PRESSURE: 98 MMHG | SYSTOLIC BLOOD PRESSURE: 122 MMHG | BODY MASS INDEX: 24.64 KG/M2 | HEIGHT: 67 IN

## 2020-06-17 DIAGNOSIS — Z34.00 PRENATAL CARE OF PRIMIGRAVIDA, ANTEPARTUM: Primary | ICD-10-CM

## 2020-06-17 DIAGNOSIS — O10.919 ESSENTIAL HYPERTENSION IN PREGNANCY: ICD-10-CM

## 2020-06-17 PROCEDURE — 76819 FETAL BIOPHYS PROFIL W/O NST: CPT | Performed by: OBSTETRICS & GYNECOLOGY

## 2020-06-17 PROCEDURE — 76820 UMBILICAL ARTERY ECHO: CPT | Performed by: OBSTETRICS & GYNECOLOGY

## 2020-06-17 NOTE — PROGRESS NOTES
[facility]  [email or web address]  [phone]    Cristian Johnson MD, FACOG     Follow-up OB visit    Chief Complaint   Patient presents with   [facility] Routine Prenatal Visit       Patient Active Problem List    Diagnosis Date Noted    Prenatal care of primigravida, antepartum 2020    Essential hypertension 2020    Essential hypertension in pregnancy 2020    Prenatal care 2019        The patient reports the following concerns of elevated blood pressure readings for the past 4 days. Patient states her blood pressure reading last night was 165/95. One episode of headaches. Vitals:    20 0851   BP: (!) 122/98   Weight: 157 lb (71.2 kg)   Height: 5' 7\" (1.702 m)     See PN flowsheet for exam    26 y.o.  38w3d   Encounter Diagnoses   Name Primary?  Prenatal care of primigravida, antepartum Yes    Essential hypertension in pregnancy        Rec IOL  Disc r/b/a of induction and pitocin use and increase risk of longer labor,  section, bleeding and infection. Pt wants to go home and rtc in am for cx moralez/IOL but will notify MD if recurrence of severe BP/sx.   Strict pih precautions           [] SAB/bleeding precautions reviewed   [] PTL/PPROM precautions reviewed   [x] Labor precautions reviewed   [] Fetal kick counts discussed   [] Labs reviewed with patient   [] [place] [facility] precautions reviewed   [] Consent reviewed   [] Handouts given to pt   [] Glucola handout    [] GBS/labor/Magic Hour handout   []    [] We reviewed CDC recommendations for Tdap for patient and close contacts and RBA of receiving in pregnancy, advised obtaining in third trimester   [] Reviewed healthy nutrition in pregnancy and good exercise practices   [] We disc safer medications in pregnancy and referred patient to [facility] resources   [] We reviewed CDC recommendations for flu vaccine and RBA of receiving in pregnancy   []    []    []           No orders of the defined types were placed in this encounter.       Elpidio Marley MD

## 2020-06-18 ENCOUNTER — ROUTINE PRENATAL (OUTPATIENT)
Dept: OBGYN CLINIC | Age: 30
End: 2020-06-18

## 2020-06-18 ENCOUNTER — HOSPITAL ENCOUNTER (INPATIENT)
Age: 30
LOS: 3 days | Discharge: HOME | End: 2020-06-21
Attending: OBSTETRICS & GYNECOLOGY | Admitting: OBSTETRICS & GYNECOLOGY
Payer: COMMERCIAL

## 2020-06-18 VITALS — HEIGHT: 67 IN | WEIGHT: 157.8 LBS | BODY MASS INDEX: 24.77 KG/M2

## 2020-06-18 DIAGNOSIS — Z34.00 PRENATAL CARE OF PRIMIGRAVIDA, ANTEPARTUM: Primary | ICD-10-CM

## 2020-06-18 DIAGNOSIS — O10.919 ESSENTIAL HYPERTENSION IN PREGNANCY: ICD-10-CM

## 2020-06-18 PROBLEM — O13.9 GESTATIONAL HYPERTENSION AFFECTING FIRST PREGNANCY: Status: ACTIVE | Noted: 2020-06-18

## 2020-06-18 LAB
ALBUMIN SERPL-MCNC: 2.7 G/DL (ref 3.5–5)
ALBUMIN/GLOB SERPL: 0.8 {RATIO} (ref 1.1–2.2)
ALP SERPL-CCNC: 206 U/L (ref 45–117)
ALT SERPL-CCNC: 18 U/L (ref 12–78)
ANION GAP SERPL CALC-SCNC: 6 MMOL/L (ref 5–15)
AST SERPL-CCNC: 26 U/L (ref 15–37)
BASOPHILS # BLD: 0 K/UL (ref 0–0.1)
BASOPHILS NFR BLD: 0 % (ref 0–1)
BILIRUB SERPL-MCNC: 0.4 MG/DL (ref 0.2–1)
BUN SERPL-MCNC: 6 MG/DL (ref 6–20)
BUN/CREAT SERPL: 10 (ref 12–20)
CALCIUM SERPL-MCNC: 8.3 MG/DL (ref 8.5–10.1)
CHLORIDE SERPL-SCNC: 109 MMOL/L (ref 97–108)
CO2 SERPL-SCNC: 21 MMOL/L (ref 21–32)
CREAT SERPL-MCNC: 0.6 MG/DL (ref 0.55–1.02)
DIFFERENTIAL METHOD BLD: ABNORMAL
EOSINOPHIL # BLD: 0.1 K/UL (ref 0–0.4)
EOSINOPHIL NFR BLD: 1 % (ref 0–7)
ERYTHROCYTE [DISTWIDTH] IN BLOOD BY AUTOMATED COUNT: 13.2 % (ref 11.5–14.5)
GLOBULIN SER CALC-MCNC: 3.4 G/DL (ref 2–4)
GLUCOSE SERPL-MCNC: 109 MG/DL (ref 65–100)
HCT VFR BLD AUTO: 35.7 % (ref 35–47)
HGB BLD-MCNC: 11.9 G/DL (ref 11.5–16)
IMM GRANULOCYTES # BLD AUTO: 0 K/UL (ref 0–0.04)
IMM GRANULOCYTES NFR BLD AUTO: 0 % (ref 0–0.5)
LYMPHOCYTES # BLD: 1.3 K/UL (ref 0.8–3.5)
LYMPHOCYTES NFR BLD: 13 % (ref 12–49)
MCH RBC QN AUTO: 28.6 PG (ref 26–34)
MCHC RBC AUTO-ENTMCNC: 33.3 G/DL (ref 30–36.5)
MCV RBC AUTO: 85.8 FL (ref 80–99)
MONOCYTES # BLD: 0.6 K/UL (ref 0–1)
MONOCYTES NFR BLD: 7 % (ref 5–13)
NEUTS SEG # BLD: 7.7 K/UL (ref 1.8–8)
NEUTS SEG NFR BLD: 79 % (ref 32–75)
NRBC # BLD: 0 K/UL (ref 0–0.01)
NRBC BLD-RTO: 0 PER 100 WBC
PLATELET # BLD AUTO: 123 K/UL (ref 150–400)
PLATELET # BLD AUTO: ABNORMAL K/UL
POTASSIUM SERPL-SCNC: 4 MMOL/L (ref 3.5–5.1)
PROT SERPL-MCNC: 6.1 G/DL (ref 6.4–8.2)
RBC # BLD AUTO: 4.16 M/UL (ref 3.8–5.2)
SODIUM SERPL-SCNC: 136 MMOL/L (ref 136–145)
WBC # BLD AUTO: 9.8 K/UL (ref 3.6–11)

## 2020-06-18 PROCEDURE — 36415 COLL VENOUS BLD VENIPUNCTURE: CPT

## 2020-06-18 PROCEDURE — 59200 INSERT CERVICAL DILATOR: CPT | Performed by: OBSTETRICS & GYNECOLOGY

## 2020-06-18 PROCEDURE — 80053 COMPREHEN METABOLIC PANEL: CPT

## 2020-06-18 PROCEDURE — 65270000029 HC RM PRIVATE

## 2020-06-18 PROCEDURE — 74011250637 HC RX REV CODE- 250/637: Performed by: OBSTETRICS & GYNECOLOGY

## 2020-06-18 PROCEDURE — 75410000000 HC DELIVERY VAGINAL/SINGLE: Performed by: OBSTETRICS & GYNECOLOGY

## 2020-06-18 PROCEDURE — 76060000078 HC EPIDURAL ANESTHESIA: Performed by: ANESTHESIOLOGY

## 2020-06-18 PROCEDURE — 85049 AUTOMATED PLATELET COUNT: CPT

## 2020-06-18 PROCEDURE — 85025 COMPLETE CBC W/AUTO DIFF WBC: CPT

## 2020-06-18 PROCEDURE — 75410000003 HC RECOV DEL/VAG/CSECN EA 0.5 HR: Performed by: OBSTETRICS & GYNECOLOGY

## 2020-06-18 PROCEDURE — 75410000002 HC LABOR FEE PER 1 HR: Performed by: OBSTETRICS & GYNECOLOGY

## 2020-06-18 RX ORDER — ZOLPIDEM TARTRATE 5 MG/1
5 TABLET ORAL
Status: DISCONTINUED | OUTPATIENT
Start: 2020-06-18 | End: 2020-06-21 | Stop reason: HOSPADM

## 2020-06-18 RX ORDER — SODIUM CHLORIDE 0.9 % (FLUSH) 0.9 %
5-40 SYRINGE (ML) INJECTION AS NEEDED
Status: DISCONTINUED | OUTPATIENT
Start: 2020-06-18 | End: 2020-06-21 | Stop reason: HOSPADM

## 2020-06-18 RX ORDER — SODIUM CHLORIDE, SODIUM LACTATE, POTASSIUM CHLORIDE, CALCIUM CHLORIDE 600; 310; 30; 20 MG/100ML; MG/100ML; MG/100ML; MG/100ML
125 INJECTION, SOLUTION INTRAVENOUS CONTINUOUS
Status: DISCONTINUED | OUTPATIENT
Start: 2020-06-19 | End: 2020-06-19

## 2020-06-18 RX ORDER — NALOXONE HYDROCHLORIDE 0.4 MG/ML
0.4 INJECTION, SOLUTION INTRAMUSCULAR; INTRAVENOUS; SUBCUTANEOUS AS NEEDED
Status: DISCONTINUED | OUTPATIENT
Start: 2020-06-18 | End: 2020-06-19 | Stop reason: SDUPTHER

## 2020-06-18 RX ORDER — ACETAMINOPHEN 325 MG/1
650 TABLET ORAL
Status: DISCONTINUED | OUTPATIENT
Start: 2020-06-18 | End: 2020-06-19 | Stop reason: SDUPTHER

## 2020-06-18 RX ORDER — BUTORPHANOL TARTRATE 2 MG/ML
1 INJECTION INTRAMUSCULAR; INTRAVENOUS
Status: DISCONTINUED | OUTPATIENT
Start: 2020-06-18 | End: 2020-06-19

## 2020-06-18 RX ORDER — METHYLDOPA 250 MG/1
250 TABLET, FILM COATED ORAL 3 TIMES DAILY
Status: DISCONTINUED | OUTPATIENT
Start: 2020-06-18 | End: 2020-06-21 | Stop reason: HOSPADM

## 2020-06-18 RX ORDER — ONDANSETRON 2 MG/ML
4 INJECTION INTRAMUSCULAR; INTRAVENOUS
Status: DISCONTINUED | OUTPATIENT
Start: 2020-06-18 | End: 2020-06-20 | Stop reason: HOSPADM

## 2020-06-18 RX ORDER — METHYLDOPA 250 MG/1
250 TABLET, FILM COATED ORAL EVERY 8 HOURS
Status: DISCONTINUED | OUTPATIENT
Start: 2020-06-18 | End: 2020-06-18

## 2020-06-18 RX ORDER — OXYTOCIN/0.9 % SODIUM CHLORIDE 30/500 ML
0-25 PLASTIC BAG, INJECTION (ML) INTRAVENOUS
Status: DISCONTINUED | OUTPATIENT
Start: 2020-06-19 | End: 2020-06-19

## 2020-06-18 RX ORDER — MAG HYDROX/ALUMINUM HYD/SIMETH 200-200-20
30 SUSPENSION, ORAL (FINAL DOSE FORM) ORAL
Status: DISCONTINUED | OUTPATIENT
Start: 2020-06-18 | End: 2020-06-20 | Stop reason: HOSPADM

## 2020-06-18 RX ADMIN — METHYLDOPA 250 MG: 250 TABLET, FILM COATED ORAL at 19:13

## 2020-06-18 RX ADMIN — ZOLPIDEM TARTRATE 5 MG: 5 TABLET ORAL at 20:57

## 2020-06-18 RX ADMIN — METHYLDOPA 250 MG: 250 TABLET, FILM COATED ORAL at 13:40

## 2020-06-18 RX ADMIN — ACETAMINOPHEN 650 MG: 325 TABLET ORAL at 16:15

## 2020-06-18 NOTE — PROGRESS NOTES
Colleen Dsouza MD, FACOG       Chart reviewed for the following:   Ortiz GHOSH, have reviewed the pertinent history and updated the medications and allergic reactions for Robina Vaughn. TIME OUT performed immediately prior to start of procedure:   Ortiz GHOSH, have performed the following reviews on Robina Vaughn prior to the start of the procedure:            * Patient was identified by name and date of birth   * Agreement on procedure being performed was verified  * Risks and Benefits explained to the patient  * Procedure site verified and marked as necessary  * Patient was positioned for comfort  * Consent was signed and verified     Time: 11:20 am    Date of procedure: 2020    Procedure performed by: Sharif Pelaez MD    Provider assisted by: Timothy Vasquez MA    Patient assisted by: self    How tolerated by patient: tolerated the procedure well with no complications    Comments: none    I was present for the entire procedure and agree with the above attestation. Kat Mendoza MD      Cervical Moralez insertion Procedure Note    Robina Vaughn is a , 26 y.o. 38w4d who presents today far placement of a moralez catheter in the cervix for ripening. Her cervix is unfavorable and she is scheduled for induction tomorrow. She has elected to have a cervical moralez catheter placement today. The risks, benefits and assets of the procedure were discussed. Her questions were answered. PROCEDURE: The moralez catheter was prepped with Betadine. A 60 Prydeinig moralez catheter was placed through the cervix without difficulty. The bulb was inflated with 60 cc of saline. Bleeding was minimal. The patient's level of discomfort was minimal.    POST PROCEDURE: The patient tolerated the procedure well. There were no complications. She was observed for 10 minutes. Report was called to Labor and Delivery and she was admitted for induction of labor.     She was instructed to check in to Labor and Delivery upon discharge from the office. Report called to [facility]. To L and D  Continue aldomet. Disc rba of magnesium (if needed) hold for now. Disc r/b/a of induction and pitocin use and increase risk of longer labor,  section, bleeding and infection.

## 2020-06-18 NOTE — PROGRESS NOTES
6/18/2020  3:06 PM    CM met with MOB to complete initial assessment and complete discharge planning. MOB verified and- confirmed demographics. MOB lives with FOB/spouse- Sage Santos ([phone]), at the address on file. This is the couple's first baby. MOB is self employed and plans to take adequate time off from work. FOB is also employed and will be taking time off. MOB reports she has good family support. MOB plans to breast feed baby and has pump to use at home. MOB plans to follow with [facility]. MOB has car seat, bassinet/crib, clothing, bottles and all necessary supplies for baby. MOB has [facility] [employer] [facility] [place], and will be adding baby to this policy. CM discussed process to add baby to insurance, MOB verbalized understanding. MOB denied needing WIC/Medicaid services. Care Management Interventions  PCP Verified by CM: Yes(Dr. Esparza)  Mode of Transport at Discharge:  Other (see comment)  Transition of Care Consult (CM Consult): Discharge Planning  Current Support Network: Lives with Spouse, Own Home, Family Lives Nearby  Confirm Follow Up Transport: Family  Discharge Location  Discharge Placement: Home with family assistance  Kentrell Rice

## 2020-06-18 NOTE — PROGRESS NOTES
1206 Patient is a G1 at 38 weeks 4 days here for an induction due to hypertension. Patient takes methyldopa 250 mg with breakfast, lunch, and dinner. Patient got a moralez balloon placed in the office at 1130. Balloon is a regular moralez cervical balloon with 60 cc's normal saline. MD states to keep patient on continuous monitoring overnight. [address] reviewed and signed with patient. Reviewed plan of care with patient. States understanding. 1227 EFM placed. 1239 Admission assessment completed. 1245 IV started and labs collected. 1255 Patient given menu to order lunch. RN asking if she is feeling contractions. Patient states mostly all she is feeling is tightening with contractions but nothing painful. 1340 BP medication given. Patient doing well. Eating lunch. Denies any needs. 46 MD called unit to let RN know patient may be NST BID and d/c continuous. 1421 Removed from EFM per MD order. 1452 BP taken. Patient doing well. States she is cramping some but overall doing well. Marlin at bedside. Platelets redrawn due to the platelets being \"clumpy\" on previous labs. 1615 Patient given tylenol by another RN for cramping. [id number] Patient stating she is feeling some relief with the cramping since tylenol and going to the bathroom. Denies any other needs. Discussed other options regarding pain control. States understanding. Denies any other medications for now. 1733 BP check. Noted to be elevated. Will recheck in 15 minutes. Patient is stating that it is not unusual to have BPs in the 160's in the evenings. 1750 BP recheck. Still systolic greater than 866. Will notify MD.     56 Spoke with Dr. Charu Navas about patient's blood pressure. MD states since patient often gets blood pressures in this range, will just continue to monitor for now. MD states recheck BP after patient get her evening dose of aldomet. Clarified if MD wants dose given now.  MD states to still give at 2000 as scheduled then recheck. 1805 Patient doing well. Is uncomfortable with contractions. Denies any pain medications at this time. [address] on patient. Doing well. States sitting up and eating has helped the discomfort. 1903 Bedside and Verbal shift change report given to ASHU Crowley (oncoming nurse) by Ирина Barrera RN (offgoing nurse). Report included the following information SBAR, Kardex, Intake/Output, MAR, Accordion, Recent Results and Med Rec Status.

## 2020-06-18 NOTE — PATIENT INSTRUCTIONS

## 2020-06-18 NOTE — PROGRESS NOTES
1900- assumed care of patient. Pt resting in bed complaining of occasional contractions. /87 and scheduled Aldomet given. Placed on monitor for NST.    2100- Pt resting in bed. VSS. No complaints. 2245- Pt resting in bed with eyes closed. 0100- Pt sleeping.    0425- EFM x 2 applied for NST prior to starting Pitocin.

## 2020-06-18 NOTE — H&P
History & Physical    Name: Jovon Huber MRN: [id number]  SSN: [id number]    YOB: 1990  Age: 26 y.o. Sex: female        Subjective:     Estimated Date of Delivery: 20  OB History        1    Para   0    Term   0       0    AB   0    Living           SAB   0    TAB   0    Ectopic   0    Molar   0    Multiple        Live Births                    Ms. Delta Jara is admitted with pregnancy at 38w4d for IOL. Prenatal course was complicated by [facility] with elevated BP. Please see prenatal records for details. Past Medical History:   Diagnosis Date    Asthma     Herniation of intervertebral disc of thoracolumbar region     Hypertension     Pap smear for cervical cancer screening 2019    negative, HPV negative    TMJ condylar resorption      Past Surgical History:   Procedure Laterality Date    HX APPENDECTOMY      HX APPENDECTOMY      HX OTHER SURGICAL      HX TONSIL AND ADENOIDECTOMY      HX TONSILLECTOMY      HX WISDOM TEETH EXTRACTION       Social History     Occupational History    Not on file   Tobacco Use    Smoking status: Never Smoker    Smokeless tobacco: Never Used   Substance and Sexual Activity    Alcohol use: Not Currently     Frequency: Never    Drug use: Never    Sexual activity: Yes     Partners: Male     Birth control/protection: None     Family History   Problem Relation Age of Onset    Hypertension Mother     Hypertension Father        Allergies   Allergen Reactions    Codeine Nausea and Vomiting    Shellfish Derived Hives and Itching     Prior to Admission medications    Medication Sig Start Date End Date Taking? Authorizing Provider   methyldopa (ALDOMET) 250 mg tablet Take 1 Tab by mouth three (3) times daily. 3/10/20  Yes Soco Kidd MD   PNV CG.85/GZWZOWR fum/folic ac (PRENATAL PO) Take 1 Tab by mouth daily.    Yes Provider, Historical   albuterol (PROVENTIL HFA, VENTOLIN HFA, PROAIR HFA) 90 mcg/actuation inhaler Take 2 Puffs by inhalation every four (4) hours as needed for Wheezing. Provider, Historical        Active Hospital Problems    Diagnosis Date Noted    Gestational hypertension affecting first pregnancy 06/18/2020       Review of Systems: A comprehensive review of systems was negative except for that written in the HPI. Constitutional: negative for fevers, chills and weight loss  ENT ROS: negative for - hearing change, oral lesions or visual changes  Respiratory: negative for cough, wheezing or dyspnea on exertion  Cardiovascular: negative for chest pain, irregular heart beats, exertional chest pressure/discomfort  Gastrointestinal: negative for dysphagia, nausea and vomiting  Genito-Urinary ROS: see HPI  Inteument/breast: negative for rash, breast lump and nipple discharge  Musculoskeletal:negative for stiff joints, neck pain and muscle weakness  Endocrine ROS: negative for - breast changes, galactorrhea or temperature intolerance  Hematological and Lymphatic ROS: negative for - bruising or swollen lymph nodes          Objective:     Vitals:  Vitals:    06/18/20 1233 06/18/20 1239 06/18/20 1452   BP:  147/84 157/85   Pulse:  72    Resp:  14    Temp:  98.2 °F (36.8 °C)    Weight: 157 lb (71.2 kg)     Height: 5' 7\" (1.702 m)            Physical Exam:  Patient without distress. Heart: Regular rate and rhythm or S1S2 present  Lung: clear to auscultation throughout lung fields, no wheezes, no rales, no rhonchi and normal respiratory effort  Abdomen: soft, nontender  Fundus: soft and non tender  Cervical Exam: 30/1/-3/vtx/soft/mid  Lower Extremities:  - No cords or calf tenderness.   EFW 7 1/2 pounds    Membranes:  Intact  Fetal Heart Rate: Baseline: 130 per minute  Variability: moderate  Accelerations: yes  Decelerations: none  TOCO: irregular    Prenatal Labs:   Lab Results   Component Value Date/Time    Rubella, External 7.68 12/02/2019    GrBStrep, External Negative 06/04/2020    HBsAg, External negative 12/02/2019 HIV, External non-reactive 12/02/2019    Gonorrhea, External negative 12/02/2019    Chlamydia, External negative 12/02/2019        WBC   Date Value Ref Range Status   06/18/2020 9.8 3.6 - 11.0 K/uL Final     RBC   Date Value Ref Range Status   06/18/2020 4.16 3.80 - 5.20 M/uL Final     HGB   Date Value Ref Range Status   06/18/2020 11.9 11.5 - 16.0 g/dL Final     HCT   Date Value Ref Range Status   06/18/2020 35.7 35.0 - 47.0 % Final     PLATELET   Date Value Ref Range Status   06/18/2020 (NOTE) K/uL Final     Comment:     THERE ARE STILL PLATELET CLUMPS PRESENT IN THE SODIUM CITRATE TUBE,   THUS AN EXACT PLATELET COUNT CAN NOT BE PROVIDED. BASED ON THE   ESTIMATE PERFORMED ON THE SLIDE, THE PLATELETS DO APPEAR TO BE NORMAL   IN NUMBER (TS). Hgb, External   Date Value Ref Range Status   04/07/2020 11.7  Final     Hct, External   Date Value Ref Range Status   04/07/2020 36.8  Final       Recent Results (from the past 12 hour(s))   CBC WITH AUTOMATED DIFF    Collection Time: 06/18/20 12:46 PM   Result Value Ref Range    WBC 9.8 3.6 - 11.0 K/uL    RBC 4.16 3.80 - 5.20 M/uL    HGB 11.9 11.5 - 16.0 g/dL    HCT 35.7 35.0 - 47.0 %    MCV 85.8 80.0 - 99.0 FL    MCH 28.6 26.0 - 34.0 PG    MCHC 33.3 30.0 - 36.5 g/dL    RDW 13.2 11.5 - 14.5 %    PLATELET (NOTE) K/uL    NRBC 0.0 0  WBC    ABSOLUTE NRBC 0.00 0.00 - 0.01 K/uL    NEUTROPHILS 79 (H) 32 - 75 %    LYMPHOCYTES 13 12 - 49 %    MONOCYTES 7 5 - 13 %    EOSINOPHILS 1 0 - 7 %    BASOPHILS 0 0 - 1 %    IMMATURE GRANULOCYTES 0 0.0 - 0.5 %    ABS. NEUTROPHILS 7.7 1.8 - 8.0 K/UL    ABS. LYMPHOCYTES 1.3 0.8 - 3.5 K/UL    ABS. MONOCYTES 0.6 0.0 - 1.0 K/UL    ABS. EOSINOPHILS 0.1 0.0 - 0.4 K/UL    ABS. BASOPHILS 0.0 0.0 - 0.1 K/UL    ABS. IMM.  GRANS. 0.0 0.00 - 0.04 K/UL    DF AUTOMATED     METABOLIC PANEL, COMPREHENSIVE    Collection Time: 06/18/20 12:46 PM   Result Value Ref Range    Sodium 136 136 - 145 mmol/L    Potassium 4.0 3.5 - 5.1 mmol/L    Chloride 109 (H) 97 - 108 mmol/L    CO2 21 21 - 32 mmol/L    Anion gap 6 5 - 15 mmol/L    Glucose 109 (H) 65 - 100 mg/dL    BUN 6 6 - 20 MG/DL    Creatinine 0.60 0.55 - 1.02 MG/DL    BUN/Creatinine ratio 10 (L) 12 - 20      GFR est AA >60 >60 ml/min/1.73m2    GFR est non-AA >60 >60 ml/min/1.73m2    Calcium 8.3 (L) 8.5 - 10.1 MG/DL    Bilirubin, total 0.4 0.2 - 1.0 MG/DL    ALT (SGPT) 18 12 - 78 U/L    AST (SGOT) 26 15 - 37 U/L    Alk. phosphatase 206 (H) 45 - 117 U/L    Protein, total 6.1 (L) 6.4 - 8.2 g/dL    Albumin 2.7 (L) 3.5 - 5.0 g/dL    Globulin 3.4 2.0 - 4.0 g/dL    A-G Ratio 0.8 (L) 1.1 - 2.2         Assessment/Plan:   26 y.o.  38w4d  CHTN with elevated BP suggestive of SIPIH  The patient does have anemia  GBS Negative  Reassuring fetal surveillance    Plan: Admit for IOL.    CEFM/TOCO  cx moralez placed in office  Continue home BP meds (aldomet)  Pitocin in am  Redraw platelets    Signed By:  Sharif Pelaez MD     2020

## 2020-06-19 ENCOUNTER — ANESTHESIA EVENT (OUTPATIENT)
Dept: LABOR AND DELIVERY | Age: 30
End: 2020-06-19
Payer: COMMERCIAL

## 2020-06-19 ENCOUNTER — ANESTHESIA (OUTPATIENT)
Dept: LABOR AND DELIVERY | Age: 30
End: 2020-06-19
Payer: COMMERCIAL

## 2020-06-19 LAB
COMMENT, HOLDF: NORMAL
SAMPLES BEING HELD,HOLD: NORMAL

## 2020-06-19 PROCEDURE — 77030014125 HC TY EPDRL BBMI -B: Performed by: ANESTHESIOLOGY

## 2020-06-19 PROCEDURE — 74011000250 HC RX REV CODE- 250: Performed by: ANESTHESIOLOGY

## 2020-06-19 PROCEDURE — 75410000002 HC LABOR FEE PER 1 HR: Performed by: OBSTETRICS & GYNECOLOGY

## 2020-06-19 PROCEDURE — 74011250637 HC RX REV CODE- 250/637: Performed by: OBSTETRICS & GYNECOLOGY

## 2020-06-19 PROCEDURE — 74011250636 HC RX REV CODE- 250/636: Performed by: ANESTHESIOLOGY

## 2020-06-19 PROCEDURE — 74011250636 HC RX REV CODE- 250/636: Performed by: OBSTETRICS & GYNECOLOGY

## 2020-06-19 PROCEDURE — 65270000029 HC RM PRIVATE

## 2020-06-19 PROCEDURE — 77030005513 HC CATH URETH FOL11 MDII -B

## 2020-06-19 PROCEDURE — 00HU33Z INSERTION OF INFUSION DEVICE INTO SPINAL CANAL, PERCUTANEOUS APPROACH: ICD-10-PCS | Performed by: ANESTHESIOLOGY

## 2020-06-19 RX ORDER — HYDROCORTISONE ACETATE PRAMOXINE HCL 2.5; 1 G/100G; G/100G
CREAM TOPICAL AS NEEDED
Status: DISCONTINUED | OUTPATIENT
Start: 2020-06-19 | End: 2020-06-21 | Stop reason: HOSPADM

## 2020-06-19 RX ORDER — SIMETHICONE 80 MG
80 TABLET,CHEWABLE ORAL
Status: DISCONTINUED | OUTPATIENT
Start: 2020-06-19 | End: 2020-06-21 | Stop reason: HOSPADM

## 2020-06-19 RX ORDER — IBUPROFEN 800 MG/1
800 TABLET ORAL EVERY 8 HOURS
Status: DISCONTINUED | OUTPATIENT
Start: 2020-06-19 | End: 2020-06-21 | Stop reason: HOSPADM

## 2020-06-19 RX ORDER — OXYTOCIN/0.9 % SODIUM CHLORIDE 20/1000 ML
125-500 PLASTIC BAG, INJECTION (ML) INTRAVENOUS ONCE
Status: COMPLETED | OUTPATIENT
Start: 2020-06-19 | End: 2020-06-19

## 2020-06-19 RX ORDER — ONDANSETRON 4 MG/1
4 TABLET, ORALLY DISINTEGRATING ORAL
Status: ACTIVE | OUTPATIENT
Start: 2020-06-19 | End: 2020-06-20

## 2020-06-19 RX ORDER — ACETAMINOPHEN 325 MG/1
650 TABLET ORAL
Status: DISCONTINUED | OUTPATIENT
Start: 2020-06-19 | End: 2020-06-21 | Stop reason: HOSPADM

## 2020-06-19 RX ORDER — BUPIVACAINE HYDROCHLORIDE 2.5 MG/ML
INJECTION, SOLUTION EPIDURAL; INFILTRATION; INTRACAUDAL AS NEEDED
Status: DISCONTINUED | OUTPATIENT
Start: 2020-06-19 | End: 2020-06-23 | Stop reason: HOSPADM

## 2020-06-19 RX ORDER — NALOXONE HYDROCHLORIDE 0.4 MG/ML
0.4 INJECTION, SOLUTION INTRAMUSCULAR; INTRAVENOUS; SUBCUTANEOUS AS NEEDED
Status: DISCONTINUED | OUTPATIENT
Start: 2020-06-19 | End: 2020-06-21 | Stop reason: HOSPADM

## 2020-06-19 RX ORDER — DOCUSATE SODIUM 100 MG/1
100 CAPSULE, LIQUID FILLED ORAL
Status: DISCONTINUED | OUTPATIENT
Start: 2020-06-19 | End: 2020-06-21 | Stop reason: HOSPADM

## 2020-06-19 RX ORDER — EPHEDRINE SULFATE/0.9% NACL/PF 50 MG/5 ML
12.5 SYRINGE (ML) INTRAVENOUS
Status: DISCONTINUED | OUTPATIENT
Start: 2020-06-19 | End: 2020-06-19

## 2020-06-19 RX ORDER — HYDROCODONE BITARTRATE AND ACETAMINOPHEN 5; 325 MG/1; MG/1
1 TABLET ORAL
Status: DISCONTINUED | OUTPATIENT
Start: 2020-06-19 | End: 2020-06-21 | Stop reason: HOSPADM

## 2020-06-19 RX ORDER — FENTANYL/BUPIVACAINE/NS/PF 2-1250MCG
1-16 PREFILLED PUMP RESERVOIR EPIDURAL CONTINUOUS
Status: DISCONTINUED | OUTPATIENT
Start: 2020-06-19 | End: 2020-06-19

## 2020-06-19 RX ORDER — DIPHENHYDRAMINE HCL 25 MG
25 CAPSULE ORAL
Status: DISCONTINUED | OUTPATIENT
Start: 2020-06-19 | End: 2020-06-21 | Stop reason: HOSPADM

## 2020-06-19 RX ORDER — NALOXONE HYDROCHLORIDE 0.4 MG/ML
0.4 INJECTION, SOLUTION INTRAMUSCULAR; INTRAVENOUS; SUBCUTANEOUS AS NEEDED
Status: DISCONTINUED | OUTPATIENT
Start: 2020-06-19 | End: 2020-06-19 | Stop reason: SDUPTHER

## 2020-06-19 RX ORDER — LIDOCAINE HYDROCHLORIDE AND EPINEPHRINE 20; 5 MG/ML; UG/ML
INJECTION, SOLUTION EPIDURAL; INFILTRATION; INTRACAUDAL; PERINEURAL AS NEEDED
Status: DISCONTINUED | OUTPATIENT
Start: 2020-06-19 | End: 2020-06-23 | Stop reason: HOSPADM

## 2020-06-19 RX ADMIN — OXYTOCIN 2 MILLI-UNITS/MIN: 10 INJECTION, SOLUTION INTRAMUSCULAR; INTRAVENOUS at 04:52

## 2020-06-19 RX ADMIN — SODIUM CHLORIDE, SODIUM LACTATE, POTASSIUM CHLORIDE, AND CALCIUM CHLORIDE 1000 ML: 600; 310; 30; 20 INJECTION, SOLUTION INTRAVENOUS at 15:37

## 2020-06-19 RX ADMIN — SODIUM CHLORIDE, SODIUM LACTATE, POTASSIUM CHLORIDE, AND CALCIUM CHLORIDE 125 ML/HR: 600; 310; 30; 20 INJECTION, SOLUTION INTRAVENOUS at 08:16

## 2020-06-19 RX ADMIN — IBUPROFEN 800 MG: 800 TABLET ORAL at 16:08

## 2020-06-19 RX ADMIN — METHYLDOPA 250 MG: 250 TABLET, FILM COATED ORAL at 14:26

## 2020-06-19 RX ADMIN — BUPIVACAINE HYDROCHLORIDE 5 ML: 2.5 INJECTION, SOLUTION EPIDURAL; INFILTRATION; INTRACAUDAL; PERINEURAL at 08:46

## 2020-06-19 RX ADMIN — SODIUM CHLORIDE, SODIUM LACTATE, POTASSIUM CHLORIDE, AND CALCIUM CHLORIDE 1000 ML: 600; 310; 30; 20 INJECTION, SOLUTION INTRAVENOUS at 07:35

## 2020-06-19 RX ADMIN — Medication 12 ML/HR: at 08:57

## 2020-06-19 RX ADMIN — LIDOCAINE HYDROCHLORIDE,EPINEPHRINE BITARTRATE 3 ML: 20; .005 INJECTION, SOLUTION EPIDURAL; INFILTRATION; INTRACAUDAL; PERINEURAL at 08:46

## 2020-06-19 RX ADMIN — Medication 5000 MILLI-UNITS/HR: at 15:40

## 2020-06-19 RX ADMIN — ACETAMINOPHEN 650 MG: 325 TABLET ORAL at 19:42

## 2020-06-19 RX ADMIN — SODIUM CHLORIDE, SODIUM LACTATE, POTASSIUM CHLORIDE, AND CALCIUM CHLORIDE 125 ML/HR: 600; 310; 30; 20 INJECTION, SOLUTION INTRAVENOUS at 04:52

## 2020-06-19 RX ADMIN — METHYLDOPA 250 MG: 250 TABLET, FILM COATED ORAL at 07:57

## 2020-06-19 RX ADMIN — METHYLDOPA 250 MG: 250 TABLET, FILM COATED ORAL at 19:43

## 2020-06-19 NOTE — ANESTHESIA PROCEDURE NOTES
Epidural Block    Start time: 6/19/2020 8:38 AM  End time: 6/19/2020 8:53 AM  Performed by: Yasmine Carreon MD  Authorized by:  Yasmine Carreon MD     Pre-Procedure  Indication: labor epidural    Preanesthetic Checklist: patient identified, risks and benefits discussed, anesthesia consent, timeout performed and anesthesia consent      Epidural:   Patient position:  Seated  Prep region:  Lumbar  Prep: Betadine    Location:  L3-4    Needle and Epidural Catheter:   Needle Type:  Tuohy  Needle Gauge:  17 G  Injection Technique:  Loss of resistance using saline  Attempts:  1  Catheter Size:  18 G  Events: no paresthesia and negative aspiration test    Test Dose:  Lidocaine 1.5% w/ epi and negative    Assessment:   Catheter Secured:  Tegaderm and tape  Insertion:  Uncomplicated  Patient tolerance:  Patient tolerated the procedure well with no immediate complications

## 2020-06-19 NOTE — LACTATION NOTE
This note was copied from a baby's chart. 1400  - Discussed with mother her plan for feeding. Reviewed the benefits of exclusive breast milk feeding during the hospital stay. Informed her of the risks of using formula to supplement in the first few days of life as well as the benefits of successful breast milk feeding; referred her to the Breastfeeding booklet about this information. She acknowledges understanding of information reviewed and states that it is her plan to breastfeed her infant. Will support her choice and offer additional information as needed. Reviewed breastfeeding basics:  How milk is made and normal  breastfeeding behaviors discussed. Supply and demand,  stomach size, early feeding cues, skin to skin bonding with comfortable positioning and baby led latch-on reviewed. How to identify signs of successful breastfeeding sessions reviewed; education on assymetrical latch, signs of effective latching vs shallow, in-effective latching, normal  feeding frequency and duration and expected infant output discussed. Normal course of breastfeeding discussed including the AAP's recommendation that children receive exclusive breast milk feedings for the first six months of life with breast milk feedings to continue through the first year of life and/or beyond as complimentary table foods are added. Breastfeeding Booklet and Warm line information provided with discussion. Discussed typical  weight loss and the importance of pediatrician appointment within 24-48 hours of discharge, at 2 weeks of life and normalcy of requesting pediatric weight checks as needed in between visits. Pt will successfully establish breastfeeding by feeding in response to early feeding cues   or wake every 3h, will obtain deep latch, and will keep log of feedings/output. Taught to BF at hunger cues and or q 2-3 hrs and to offer 10-20 drops of hand expressed colostrum at any non-feeds.       Breast Assessment  Left Breast: Medium  Left Nipple: Everted, Intact  Right Breast: Medium  Right Nipple: Everted, Intact  Breast- Feeding Assessment  Attends Breast-Feeding Classes: Yes  Breast-Feeding Experience: No  Breast Trauma/Surgery: No  Type/Quality: Attempted(per mom)  Lactation Consultant Visits  Breast-Feedings: Good   Mother/Infant Observation  Mother Observation: Alignment, Breast comfortable, Close hold, Holds breast, Recognizes feeding cues  Infant Observation: Latches nipple and aereolae, Lips flanged, lower, Lips flanged, upper, Opens mouth  LATCH Documentation  Latch: Repeated attempts, hold nipple in mouth, stimulate to suck  Audible Swallowing: None  Type of Nipple: Everted (after stimulation)  Comfort (Breast/Nipple): Soft/non-tender  Hold (Positioning): Full assist, teach one side, mother does other, staff holds  LATCH Score: 6     Baby nursing within 1 hour of delivery. Mom not comfortable in cradle hold. Infant to football hold. Able to latch, stimulate to suck. Mom taught hand expression. Reviewed importance of skin to skin and unrestricted access to breasts to feed on demand to early cues.

## 2020-06-19 NOTE — ANESTHESIA PREPROCEDURE EVALUATION
Relevant Problems   No relevant active problems       Anesthetic History   No history of anesthetic complications            Review of Systems / Medical History  Patient summary reviewed, nursing notes reviewed and pertinent labs reviewed    Pulmonary            Asthma        Neuro/Psych   Within defined limits           Cardiovascular    Hypertension                   GI/Hepatic/Renal  Within defined limits              Endo/Other  Within defined limits           Other Findings   Comments: TMJ    plts = 123              Anesthetic Plan    ASA: 2  Anesthesia type: epidural            Anesthetic plan and risks discussed with: Patient

## 2020-06-19 NOTE — PROGRESS NOTES
Labor Progress Note    Patient seen, fetal heart rate and contraction pattern evaluated. Physical Exam:  Visit Vitals  BP (!) 158/93   Pulse 64   Temp 98.6 °F (37 °C)   Resp 16   Ht 5' 7\" (1.702 m)   Wt 157 lb (71.2 kg)   SpO2 100%   Breastfeeding No   BMI 24.59 kg/m²       Cervical Exam:    /-3  arom clear moderate amount  Adequate pelvimetry    Membranes:  Intact      Assessment/Plan:  26 y.o.  38w5d     Reassuring fetal status. Anticipate   CEFM/TOCO    Aminta Cullen MD      NST Inpatient Procedure Note    Adilson De Souza presents for fetal non-stress test.    Indication is labor. She is 38w5d. She has been monitored for more than 60 minutes. The FHR was reactive. NST Interpretation:   FHR baseline 130 bpm,   Variability moderate  Accelerations present. Decelerations Absent. Uterine contractions were q 4min     Assessment  NST is reactive. NST is reassuring. Patient does need admission/observation for further monitoring.       Plan:    [x] Continue admission to labor and delivery    [] Continue observation   [] Keep routine OB follow up upon discharge   [] Reviewed fetal movement kick counts, notify MD if decreased   []    []

## 2020-06-19 NOTE — PROGRESS NOTES
Delivery Note    Patient C/C/+2, pushed over intact perineum. LBFI  Cord clamped by MD because short and unable to bring baby to pt's abdomen without strain. Spontaneous placenta delivery.   Laceration repaired: none: right periurethral hemostatic  EBL 300cc

## 2020-06-19 NOTE — PROGRESS NOTES
0705-Bedside shift change report given to DON Ward RN (oncoming nurse) by Brandon Larkin RN (offgoing nurse). Report included the following information SBAR, Kardex, Intake/Output, MAR, Recent Results and Med Rec Status. 0733-Junior Santos at bedside, aware of BP readings this morning and pt to receive aldamet dose @ 0800, no orders received, Castellon balloon removed    0734-VE 5/80/-3, AROM, clear fluid    0735-IV bolus started as pt requests epidural    0737-Pt up to bathroom    0800-Dr. Caba notified orders needed for epidural and pt bolusing, MD states to call him back when ready. No further orders received. 0838-Moustapha Donna at bedside    5509-XI    0846-catheter placed    0905-Urinary Catheter placed, with sterile technique, secured to right leg with securement device, witnessed by ASHU Sahni RN    0906-Pt repositioned to high fowlers    0919-Pt repositioned to far left side, peanut ball between knees    1010-Pt repositioned to far right side, peanut ball  Between knees, trendelenburg    1104-Pt repositioned to far left side, peanut ball between knees, trendelenburg. Pt denies pain, stating she is very comfortable with epidural, feels intermittent vaginal pressure    1210-Dr. Esparza at bedside, VE 10 cm, orders received to start pushing patient once pt  returns to room from eating lunch. Call MD when ready for delivery. 1255-Dr. Esparza notified via Perfect Serve that pt is ready for delivery    1301-Dr. Esparza at bedside for delivery    1305-Pt delivered female     1325-MD orders to continue aldamet TID PO as previously ordered, no further orders received, MD out of room at this time    1413-Perfect Serve sent to Dr. Chong Pabon asking for postpartum orders    1516-Straight cath 800 ml    1520-Several golf ball sized blood clots expelled vaginally, 525 ml    1525-/67, pt denies feeling any lightheadedness, dizziness, or nausea, pt reports feeling fine.  RN encourages pt to call out if not feeling ok suddenly as BP is normal range but probably low for patient as her BP tends to stay a little elevated. Pt and pt  verbalize understanding. TOTAL QBL-885 ml    1535-RN at bedside as pt called out c/o not feeling well, BP taken, 90/50    1536-Dr. Esparza at bedside, Checks periurethral tear, does not appear to be bleeding. Orders received for 1 liter LR bolus now, 1 liter 20 unit pitocin infusion at 125 ml/hr, insert moralez for accurate urine measurement and keep pt in L&D until tomorrow morning. 1537-IV bolus started    1540-Dr. Esparza manually removed one blood clot equivalent to 3 grapes in size    1545-90 ml QBL    1610-35 ml QBL    1640-RN at bedside, LR bolus complete, Scant bleeding present on ice pad, pt continues to report feeling better    1710-50 ml QBL    1840-Epidural catheter removed, pt continues to report feeling ok    1905-Bedside shift change report given to DON Kenny RN (oncoming nurse) by Yvonne Louise RN (offgoing nurse). Report included the following information SBAR, Kardex, Intake/Output, MAR, Recent Results and Med Rec Status.

## 2020-06-19 NOTE — PROGRESS NOTES
Problem: Falls - Risk of  Goal: *Absence of Falls  Description: Document Angelo Romo Fall Risk and appropriate interventions in the flowsheet.   Outcome: Progressing Towards Goal  Note: Fall Risk Interventions:            Medication Interventions: Patient to call before getting OOB                   Problem: Patient Education: Go to Patient Education Activity  Goal: Patient/Family Education  Outcome: Progressing Towards Goal     Problem: Patient Education: Go to Patient Education Activity  Goal: Patient/Family Education  Outcome: Progressing Towards Goal     Problem: Vaginal Delivery: Day of Deliver-Laboring  Goal: Activity/Safety  Outcome: Progressing Towards Goal  Goal: Consults, if ordered  Outcome: Progressing Towards Goal  Goal: Diagnostic Test/Procedures  Outcome: Progressing Towards Goal  Goal: Nutrition/Diet  Outcome: Progressing Towards Goal  Goal: Discharge Planning  Outcome: Progressing Towards Goal  Goal: Medications  Outcome: Progressing Towards Goal  Goal: Respiratory  Outcome: Progressing Towards Goal  Goal: Treatments/Interventions/Procedures  Outcome: Progressing Towards Goal  Goal: *Vital signs within defined limits  Outcome: Progressing Towards Goal  Goal: *Labs within defined limits  Outcome: Progressing Towards Goal  Goal: *Hemodynamically stable  Outcome: Progressing Towards Goal  Goal: *Optimal pain control at patient's stated goal  Outcome: Progressing Towards Goal     Problem: Vaginal Delivery: Day of Deliver-Laboring  Goal: Activity/Safety  Outcome: Progressing Towards Goal  Goal: Consults, if ordered  Outcome: Progressing Towards Goal  Goal: Diagnostic Test/Procedures  Outcome: Progressing Towards Goal  Goal: Nutrition/Diet  Outcome: Progressing Towards Goal  Goal: Discharge Planning  Outcome: Progressing Towards Goal  Goal: Medications  Outcome: Progressing Towards Goal  Goal: Respiratory  Outcome: Progressing Towards Goal  Goal: Treatments/Interventions/Procedures  Outcome: Progressing Towards Goal  Goal: *Vital signs within defined limits  Outcome: Progressing Towards Goal  Goal: *Labs within defined limits  Outcome: Progressing Towards Goal  Goal: *Hemodynamically stable  Outcome: Progressing Towards Goal  Goal: *Optimal pain control at patient's stated goal  Outcome: Progressing Towards Goal

## 2020-06-19 NOTE — PROGRESS NOTES
ITSP    Nurse reporting low BP, pt dizzy and persistent bleeding/clots after bladder full. Bladder emptied 800cc. Uterus firm. Perineum inspected: right periurethral hemostatic with pressure    15 cc clot removed at cervix but fundus firm on BME by MD.    Pitocin in liter with bolus. Visit Vitals  /87   Pulse 87   Temp 97.7 °F (36.5 °C)   Resp 16   Ht 5' 7\" (1.702 m)   Wt 157 lb (71.2 kg)   SpO2 98%   Breastfeeding Unknown   BMI 24.59 kg/m²     Place moralez overnight.   CBC in am  Monitor PP bleeding     Wilfred Salazar MD

## 2020-06-20 LAB
ALBUMIN SERPL-MCNC: 1.9 G/DL (ref 3.5–5)
ALBUMIN/GLOB SERPL: 0.8 {RATIO} (ref 1.1–2.2)
ALP SERPL-CCNC: 143 U/L (ref 45–117)
ALT SERPL-CCNC: 13 U/L (ref 12–78)
ANION GAP SERPL CALC-SCNC: 5 MMOL/L (ref 5–15)
AST SERPL-CCNC: 33 U/L (ref 15–37)
BILIRUB SERPL-MCNC: 0.4 MG/DL (ref 0.2–1)
BUN SERPL-MCNC: 5 MG/DL (ref 6–20)
BUN/CREAT SERPL: 9 (ref 12–20)
CALCIUM SERPL-MCNC: 7.6 MG/DL (ref 8.5–10.1)
CHLORIDE SERPL-SCNC: 112 MMOL/L (ref 97–108)
CO2 SERPL-SCNC: 24 MMOL/L (ref 21–32)
CREAT SERPL-MCNC: 0.58 MG/DL (ref 0.55–1.02)
ERYTHROCYTE [DISTWIDTH] IN BLOOD BY AUTOMATED COUNT: 13.3 % (ref 11.5–14.5)
GLOBULIN SER CALC-MCNC: 2.4 G/DL (ref 2–4)
GLUCOSE SERPL-MCNC: 70 MG/DL (ref 65–100)
HCT VFR BLD AUTO: 26 % (ref 35–47)
HGB BLD-MCNC: 8.5 G/DL (ref 11.5–16)
MCH RBC QN AUTO: 28.2 PG (ref 26–34)
MCHC RBC AUTO-ENTMCNC: 32.7 G/DL (ref 30–36.5)
MCV RBC AUTO: 86.4 FL (ref 80–99)
NRBC # BLD: 0 K/UL (ref 0–0.01)
NRBC BLD-RTO: 0 PER 100 WBC
PLATELET # BLD AUTO: ABNORMAL K/UL (ref 150–400)
POTASSIUM SERPL-SCNC: 4.1 MMOL/L (ref 3.5–5.1)
PROT SERPL-MCNC: 4.3 G/DL (ref 6.4–8.2)
RBC # BLD AUTO: 3.01 M/UL (ref 3.8–5.2)
SODIUM SERPL-SCNC: 141 MMOL/L (ref 136–145)
WBC # BLD AUTO: 13.1 K/UL (ref 3.6–11)

## 2020-06-20 PROCEDURE — 65270000029 HC RM PRIVATE

## 2020-06-20 PROCEDURE — 36415 COLL VENOUS BLD VENIPUNCTURE: CPT

## 2020-06-20 PROCEDURE — 85027 COMPLETE CBC AUTOMATED: CPT

## 2020-06-20 PROCEDURE — 94760 N-INVAS EAR/PLS OXIMETRY 1: CPT

## 2020-06-20 PROCEDURE — 80053 COMPREHEN METABOLIC PANEL: CPT

## 2020-06-20 PROCEDURE — 74011250637 HC RX REV CODE- 250/637: Performed by: OBSTETRICS & GYNECOLOGY

## 2020-06-20 RX ADMIN — IBUPROFEN 800 MG: 800 TABLET ORAL at 04:29

## 2020-06-20 RX ADMIN — METHYLDOPA 250 MG: 250 TABLET, FILM COATED ORAL at 16:29

## 2020-06-20 RX ADMIN — METHYLDOPA 250 MG: 250 TABLET, FILM COATED ORAL at 08:15

## 2020-06-20 RX ADMIN — IBUPROFEN 800 MG: 800 TABLET ORAL at 16:29

## 2020-06-20 RX ADMIN — METHYLDOPA 250 MG: 250 TABLET, FILM COATED ORAL at 21:01

## 2020-06-20 NOTE — PROGRESS NOTES
2120: Pt called out with c/o feeling like she had a \"gush\" of blood and that peripad may be saturated. Pt had just changed positions in bed and was attempting to nurse baby. Fundus assessed to be Firm at U-2 with scant amount of bleeding. Small amount of blood noted on peripad. Pad changed and pt reassured. 0130: SBAR report received from DON Kenny RN.    5116: Pt very tired. States she has slept only 3 hours. Encouraged pt to continue resting while baby is sleeping. 0715: SBAR report given to CHEIKH Jimenez

## 2020-06-20 NOTE — PROGRESS NOTES
Post-Partum Day Number 1 Progress Note      Patient doing well post-partum without significant complaint. She still has moralez in place (had episode of hypotension and persistent bleeding), has peed around the moralez. PP she reports normal lochia. She is ambulatiing without dizziness. Her pain is well controlled with oral pain medication. She is tolerating general diet. CHTN, BPs back up to 140s/70s-80s  Hgb=8.5 this AM      Vitals:    Patient Vitals for the past 8 hrs:   BP Temp Pulse Resp SpO2   20 0808 141/70 98.8 °F (37.1 °C) 75 16 98 %     Temp (24hrs), Av.3 °F (36.8 °C), Min:97.7 °F (36.5 °C), Max:98.8 °F (37.1 °C)        Exam:  Patient without distress. Lungs: CTA bilaterally               CV: regular rate/rhythm, no rubs or gallops, no murmur               Abdomen soft, nontender, nondistended, normal bowel sounds               Uterus: fundus firm at level of umbilicus, nontender               Lower extremities are negative for cords or tenderness, no swelling. Labs:   Recent Results (from the past 24 hour(s))   CBC W/O DIFF    Collection Time: 20  4:08 AM   Result Value Ref Range    WBC 13.1 (H) 3.6 - 11.0 K/uL    RBC 3.01 (L) 3.80 - 5.20 M/uL    HGB 8.5 (L) 11.5 - 16.0 g/dL    HCT 26.0 (L) 35.0 - 47.0 %    MCV 86.4 80.0 - 99.0 FL    MCH 28.2 26.0 - 34.0 PG    MCHC 32.7 30.0 - 36.5 g/dL    RDW 13.3 11.5 - 14.5 %    PLATELET  014 - 580 K/uL     UNABLE TO REPORT ACCURATE COUNT DUE TO PLATELET AGGREGATION, HOWEVER, PLATELETS APPEAR NORMAL IN NUMBER ON SMEAR. PLEASE RESUBMIT SODIUM CITRATE (BLUE) AND EDTA (LAVENDER) TUBES FOR HEMATOLOGICAL TESTING.     NRBC 0.0 0  WBC    ABSOLUTE NRBC 0.00 0.00 - 4.59 K/uL   METABOLIC PANEL, COMPREHENSIVE    Collection Time: 20  4:08 AM   Result Value Ref Range    Sodium 141 136 - 145 mmol/L    Potassium 4.1 3.5 - 5.1 mmol/L    Chloride 112 (H) 97 - 108 mmol/L    CO2 24 21 - 32 mmol/L    Anion gap 5 5 - 15 mmol/L    Glucose 70 65 - 100 mg/dL    BUN 5 (L) 6 - 20 MG/DL    Creatinine 0.58 0.55 - 1.02 MG/DL    BUN/Creatinine ratio 9 (L) 12 - 20      GFR est AA >60 >60 ml/min/1.73m2    GFR est non-AA >60 >60 ml/min/1.73m2    Calcium 7.6 (L) 8.5 - 10.1 MG/DL    Bilirubin, total 0.4 0.2 - 1.0 MG/DL    ALT (SGPT) 13 12 - 78 U/L    AST (SGOT) 33 15 - 37 U/L    Alk. phosphatase 143 (H) 45 - 117 U/L    Protein, total 4.3 (L) 6.4 - 8.2 g/dL    Albumin 1.9 (L) 3.5 - 5.0 g/dL    Globulin 2.4 2.0 - 4.0 g/dL    A-G Ratio 0.8 (L) 1.1 - 2.2         Lab Results   Component Value Date/Time    Rubella, External 7.68 2019    GrBStrep, External Negative 2020    HBsAg, External negative 2019    HIV, External non-reactive 2019    Gonorrhea, External negative 2019    Chlamydia, External negative 2019       Assessment and Plan:   Postpartum Day #1 S/P . Doing well. Nl lochia.   Anemia.   - OK to d/c moralez   - continue BP meds as ordered   - routine care   - anticipate discharge in AM

## 2020-06-20 NOTE — LACTATION NOTE
This note was copied from a baby's chart. Hand Expression Education:  Mom taught how to manually hand express her colostrum. Emphasized the importance of providing infant with valuable colostrum as infant rests skin to skin at breast.  Aware to avoid extended periods of non-feeding. Aware to offer 10-20+ drops of colostrum every 2-3 hours until infant is latching and nursing effectively. Taught the rationale behind this low tech but highly effective evidence based practice. Engorgement Care Guidelines:  Reviewed how milk is made and normal phases of milk production. Taught care of engorged breasts - frequent breastfeeding encouraged, cool packs and motrin as tolerated. Anticipatory guidance shared. Early Bonding:  Explain to mother that we place healthy term babies skin to skin with the mother immediately following delivery. Newborns are to remain on mother's chest for a minimum of one hour or until infant has fed from the breast.  Juniata assessments should be performed as infant rests at mother's breast and other infant procedures should be delayed for at least one hour. All staff are to encourage Rooming-In. Explain to mother that research shows that mothers actually sleep better when baby is in close proximity to where mother is sleeping. Rooming-In enhances bonding time and facilitates frequent breastfeeding. Parents learn how to care for their baby when  remains in the room. Mom states nursing is going well. Mom practicing lots of skin to skin with unrestricted access to breasts and feeding on demand to early cues. Nipples a bit tender on latch and lanolin to bedside.  Mom taught to start on least sort side first.

## 2020-06-20 NOTE — PROGRESS NOTES
8015 Received report from Glenramírez Zaki, [address] REPORT:    Verbal report given to Shade Schulz RN(name) on Talita Hawkins  being transferred to MIU(unit) for routine progression of care       Report consisted of patients Situation, Background, Assessment and   Recommendations(SBAR). Information from the following report(s) SBAR, Kardex and MAR was reviewed with the receiving nurse. Lines:   Peripheral IV 06/18/20 Right;Posterior Forearm (Active)   Site Assessment Clean, dry, & intact 6/20/2020  8:08 AM   Phlebitis Assessment 0 6/20/2020  8:08 AM   Infiltration Assessment 0 6/20/2020  8:08 AM   Dressing Status Clean, dry, & intact 6/20/2020  8:08 AM   Dressing Type Tape;Transparent 6/20/2020  8:08 AM   Hub Color/Line Status Pink;Capped 6/20/2020  8:08 AM   Alcohol Cap Used Yes 6/18/2020  7:27 PM        Opportunity for questions and clarification was provided.       Patient transported with:   Registered Nurse

## 2020-06-20 NOTE — ROUTINE PROCESS
Bedside and Verbal shift change report given to Clari Li rn (oncoming nurse) by Doris Crawford. Paulie Varma (offgoing nurse). Report given with SBAR, Kardex, Intake/Output and MAR.

## 2020-06-21 VITALS
WEIGHT: 157 LBS | HEIGHT: 67 IN | DIASTOLIC BLOOD PRESSURE: 75 MMHG | SYSTOLIC BLOOD PRESSURE: 140 MMHG | TEMPERATURE: 98.7 F | OXYGEN SATURATION: 98 % | BODY MASS INDEX: 24.64 KG/M2 | RESPIRATION RATE: 18 BRPM | HEART RATE: 82 BPM

## 2020-06-21 PROCEDURE — 77030021125

## 2020-06-21 PROCEDURE — 74011250637 HC RX REV CODE- 250/637: Performed by: OBSTETRICS & GYNECOLOGY

## 2020-06-21 RX ORDER — IBUPROFEN 600 MG/1
600 TABLET ORAL
Qty: 30 TAB | Refills: 1 | Status: SHIPPED | OUTPATIENT
Start: 2020-06-21 | End: 2020-08-13

## 2020-06-21 RX ADMIN — METHYLDOPA 250 MG: 250 TABLET, FILM COATED ORAL at 08:49

## 2020-06-21 RX ADMIN — IBUPROFEN 800 MG: 800 TABLET ORAL at 01:47

## 2020-06-21 NOTE — DISCHARGE SUMMARY
Obstetrical Discharge Summary     Name: Robina Vaughn MRN: [id number]  SSN: [id number]    YOB: 1990  Age: 26 y.o. Sex: female      Admit Date: 2020    Discharge Date: 2020     Admitting Physician: Sharif Pelaez MD     Attending Physician:  Kan Bolanos MD     Admission Diagnoses: Gestational hypertension affecting first pregnancy [O13.9]    Discharge Diagnoses:   Information for the patient's :  Shanika Haro [[id number]]   Delivery of a 7 lb 6.5 oz (3.36 kg) female infant via Vaginal, Spontaneous on 2020 at 1:05 PM  by Berkley Jose. Apgars were 8  and 9 . Additional Diagnoses:   Hospital Problems  Date Reviewed: 2020          Codes Class Noted POA    Gestational hypertension affecting first pregnancy ICD-10-CM: O13.9  ICD-9-CM: 642.30  2020 Unknown             Lab Results   Component Value Date/Time    Rubella, External 7.68 2019    GrBStrep, External Negative 2020       Hospital Course: Normal hospital course following the delivery. Condition on discharge: stable  Disposition: Home  Patient Instructions:   Current Discharge Medication List      START taking these medications    Details   ibuprofen (MOTRIN) 600 mg tablet Take 1 Tab by mouth every six (6) hours as needed for Pain. Qty: 30 Tab, Refills: 1         CONTINUE these medications which have NOT CHANGED    Details   methyldopa (ALDOMET) 250 mg tablet Take 1 Tab by mouth three (3) times daily. Qty: 90 Tab, Refills: 3      PNV AA.17/AQHDIRX fum/folic ac (PRENATAL PO) Take 1 Tab by mouth daily. albuterol (PROVENTIL HFA, VENTOLIN HFA, PROAIR HFA) 90 mcg/actuation inhaler Take 2 Puffs by inhalation every four (4) hours as needed for Wheezing. Reference my discharge instructions.     Follow-up Appointments   Procedures    FOLLOW UP VISIT Appointment in: 6 Weeks     Standing Status:   Standing     Number of Occurrences:   1     Order Specific Question: Appointment in     Answer:   6 Weeks        Signed By:  Calvin Bland MD     June 21, 2020

## 2020-06-21 NOTE — ROUTINE PROCESS
Bedside and Verbal shift change report given to Brayan Whiteside RN (oncoming nurse) by Juan J Mcduffie RN (offgoing nurse). Report included the following information SBAR, Kardex, Intake/Output, MAR and Accordion.

## 2020-06-21 NOTE — PROGRESS NOTES
S/ voiding without problem, no complaints    O/ VSS AF        Abdomen non tender, fundus firm      A/ S/P vaginal delivery, stable    P/  Routine care        Discharge home with routine instructions        Motrin prn; continue Ald[place] at home

## 2020-06-21 NOTE — PROGRESS NOTES
Patient discharged to home with infant and significant other. Infant in carseat. Patient in wheelchair. Prescriptions given x 1 (ibuprofen). Discharge instructions reviewed with patient and significant other, signed by patient, and copies given. Per patient and significant other, no questions. Patient and infant bands verified. See infant note and/or footprint sheet on chart.

## 2020-06-22 ENCOUNTER — TELEPHONE (OUTPATIENT)
Dept: CARDIOLOGY CLINIC | Age: 30
End: 2020-06-22

## 2020-06-22 NOTE — TELEPHONE ENCOUNTER
Pt's spouse requesting to schedule an appt with Dr. Epi Gutierrez. Ref Dr. Myron Santiago), Dx: heart palpitations.  Please advise      Phone: [phone]

## 2020-06-22 NOTE — DISCHARGE INSTRUCTIONS
[facility] OB/GYN  Name: Rosalino Cifuentes  Date of Birth:       General:     Diet/Diet Restrictions:  Try to drink eight 8-ounce glasses of fluid daily (water, juices); avoid excessive caffeine intake. Meals/snacks as desired which are high in fiber and carbohydrates and low in fat and cholesterol. Medications:     Anemia:  Your results show some anemia, or low blood count. You should start, or add, an over the counter elemental iron supplement of 45-65 mg. Consider 'Slow FE' or ferrous sulfate 325 mg once a day for at least three months. Also take vitamin C 250 mg and a daily prenatal vitamin to help anemia. Add a stool softener, like docusate or colace 100 mg (2x/day) to avoid constipation. If you do not tolerate supplements you can try blackstrap molasses (1 tbsp=27mg elemental iron). Physical Activity / Restrictions / Safety:     Avoid heavy lifting, no more that 10 lbs. for 2-3 weeks; No driving while taking narcotic pain medication. Post  patients should not drive until pain free. No intercourse for 4-6 weeks if you had a vaginal delivery and 6 weeks if you had a  Section. Always use contraception even if you are breastfeeding. No douching or tampon use. May resume exercise in 6 weeks. Discharge Instructions/Special Treatment/Home Care Needs:     Continue prenatal vitamins. Continue to use squirt bottle with warm water on your episiotomy after each bathroom use until bleeding stops. If you had a  Section and if steri-strips were applied to your incision, remove them in 7 days. If they fall off before then it's okay. If you are sent home with staples still on the incision then call the office to make an appointment within the next week to remove them. Take stool softeners (Colace) daily when you first get home and then as needed for constipation. Take them for 3-4 weeks if you had a large vaginal tear.     Call your doctor for the following:     Fever over 101 degrees by mouth. Vaginal bleeding heavier than a normal menstrual period or clots larger than a golf ball. Red streaks or increased swelling of legs, painful red streaks on your breast.  Painful urination, or increased pain, redness or discharge with your incision. Pain Management:     Pain Management:   Take Acetaminophen (Tylenol) or Ibuprofen (Advil, Motrin) as directed for pain. Take perscribed narcotic meds if over the counter meds are not adequately controlling your pain. Use a warm Sitz bath 3 times daily to relieve episiotomy or hemorrhoidal discomfort. A heating pad applied to your lower abdomen can help relieve  incision pain as well as uterine cramps from delivery. For hemorrhoidal discomfort use Tucks pads and Anusol cream/Preparation H as needed and directed. Follow-Up Care:     Unless instructed otherwise, make an appointment for 6 weeks after delivery. Telephone number: [phone]    Signed By: Zayra Hernandez MD                                                                                                   Date: 2020 Time: 9:14 AM    Patient Education        After Your Delivery (the Postpartum Period): Care Instructions  Your Care Instructions    Congratulations on the birth of your baby. Like pregnancy, the  period can be a time of excitement, matias, and exhaustion. You may look at your wondrous little baby and feel happy. You may also be overwhelmed by your new sleep hours and new responsibilities. At first, babies often sleep during the days and are awake at night. They do not have a pattern or routine. They may make sudden gasps, jerk themselves awake, or look like they have crossed eyes. These are all normal, and they may even make you smile. In these first weeks after delivery, try to take good care of yourself. It may take 4 to 6 weeks to feel like yourself again, and possibly longer if you had a  birth. You will likely feel very tired for several weeks. Your days will be full of ups and downs, but lots of matias as well. Follow-up care is a key part of your treatment and safety. Be sure to make and go to all appointments, and call your doctor if you are having problems. It's also a good idea to know your test results and keep a list of the medicines you take. How can you care for yourself at home? Take care of your body after delivery  · Use pads instead of tampons for the bloody flow that may last as long as 2 weeks. · Ease cramps with ibuprofen (Advil, Motrin). · Ease soreness of hemorrhoids and the area between your vagina and rectum with ice compresses or witch hazel pads. · Ease constipation by drinking lots of fluid and eating high-fiber foods. Ask your doctor about over-the-counter stool softeners. · Cleanse yourself with a gentle squeeze of warm water from a bottle instead of wiping with toilet paper. · Take a sitz bath in warm water several times a day. · Wear a good nursing bra. Ease sore and swollen breasts with warm, wet washcloths. · If you are not breastfeeding, use ice rather than heat for breast soreness. · Your period may not start for several months if you are breastfeeding. You may bleed more, and longer at first, than you did before you got pregnant. · Wait until you are healed (about 4 to 6 weeks) before you have sexual intercourse. Your doctor will tell you when it is okay to have sex. · Try not to travel with your baby for 5 or 6 weeks. If you take a long car trip, make frequent stops to walk around and stretch. Avoid exhaustion  · Rest every day. Try to nap when your baby naps. · Ask another adult to be with you for a few days after delivery. · Plan for  if you have other children. · Stay flexible so you can eat at odd hours and sleep when you need to. Both you and your baby are making new schedules. · Plan small trips to get out of the house.  Change can make you feel less tired. · Ask for help with housework, cooking, and shopping. Remind yourself that your job is to care for your baby. Know about help for postpartum depression  · \"Baby blues\" are common for the first 1 to 2 weeks after birth. You may cry or feel sad or irritable for no reason. · Rest whenever you can. Being tired makes it harder to handle your emotions. · Go for walks with your baby. · Talk to your partner, friends, and family about your feelings. · If your symptoms last for more than a few weeks, or if you feel very depressed, ask your doctor for help. · Postpartum depression can be treated. Support groups and counseling can help. Sometimes medicine can also help. Stay healthy  · Eat healthy foods so you have more energy and lose extra baby pounds. · If you breastfeed, avoid drugs. If you quit smoking during pregnancy, try to stay smoke-free. If you choose to have a drink now and then, have only one drink, and limit the number of occasions that you have a drink. Wait to breastfeed at least 2 hours after you have a drink to reduce the amount of alcohol the baby may get in the milk. · Start daily exercise after 4 to 6 weeks, but rest when you feel tired. · Learn exercises to tone your belly. Do Kegel exercises to regain strength in your pelvic muscles. You can do these exercises while you stand or sit. ? Squeeze the same muscles you would use to stop your urine. Your belly and thighs should not move. ? Hold the squeeze for 3 seconds, and then relax for 3 seconds. ? Start with 3 seconds. Then add 1 second each week until you are able to squeeze for 10 seconds. ? Repeat the exercise 10 to 15 times for each session. Do three or more sessions each day. · Find a class for new mothers and new babies that has an exercise time. · If you had a  birth, give yourself a bit more time before you exercise, and be careful. When should you call for help?   Call  911 anytime you think you may need emergency care. For example, call if:    · You have thoughts of harming yourself, your baby, or another person.     · You passed out (lost consciousness).     · You have chest pain, are short of breath, or cough up blood.     · You have a seizure.    Call your doctor now or seek immediate medical care if:    · You have severe vaginal bleeding. This means you are passing blood clots and soaking through a pad each hour for 2 or more hours.     · You are dizzy or lightheaded, or you feel like you may faint.     · You have a fever.     · You have new or more belly pain.     · You have signs of a blood clot in your leg (called a deep vein thrombosis), such as:  ? Pain in the calf, back of the knee, thigh, or groin. ? Redness and swelling in your leg or groin.     · You have signs of preeclampsia, such as:  ? Sudden swelling of your face, hands, or feet. ? New vision problems (such as dimness, blurring, or seeing spots). ? A severe headache.    Watch closely for changes in your health, and be sure to contact your doctor if:    · Your vaginal bleeding seems to be getting heavier.     · You have new or worse vaginal discharge.     · You feel sad, anxious, or hopeless for more than a few days.     · You do not get better as expected. Where can you learn more? Go to http://sherlyn-lei.info/  Enter A461 in the search box to learn more about \"After Your Delivery (the Postpartum Period): Care Instructions. \"  Current as of: May 29, 2019Content Version: 12.4  © 1816-4851 [employer]. Care instructions adapted under license by [employer] (which disclaims liability or warranty for this information). If you have questions about a medical condition or this instruction, always ask your healthcare professional. [address] any warranty or liability for your use of this information.

## 2020-06-22 NOTE — DISCHARGE SUMMARY
Obstetrical Discharge Summary     Name: Hardy Hills MRN: [id number]  SSN: [id number]    YOB: 1990  Age: 26 y.o. Sex: female      Admit Date: 2020    Discharge Date:   2020 11:23 AM    Admitting Physician: Tami Velasco MD     Attending Physician:  No att. providers found   Tami Velasco MD      Admission Diagnoses: Gestational hypertension affecting first pregnancy [O13.9]    Condition on Discharge: Stable    Procedures: , IOL    Disposition: to home    Follow up: Follow-up Appointments   Procedures    FOLLOW UP VISIT Appointment in: 6 Weeks     Standing Status:   Standing     Number of Occurrences:   1     Order Specific Question:   Appointment in     Answer:   6 Weeks        Discharge Diagnoses:   Information for the patient's :  Mack Leonard [[id number]]   Delivery of a 7 lb 6.5 oz (3.36 kg) female infant via Vaginal, Spontaneous on 2020 at 1:05 PM  by Izzy Pike. Apgars were 8  and 9 . Additional Diagnoses:   Hospital Problems  Date Reviewed: 2020          Codes Class Noted POA    Gestational hypertension affecting first pregnancy ICD-10-CM: O13.9  ICD-9-CM: 642.30  2020 Unknown             Lab Results   Component Value Date/Time    Rubella, External 7.68 2019    GrBStrep, External Negative 2020       Hospital Course:  Patient was induced with cervical ripening with a moralez followed by pitocin and AROM for SIPIH with BP above her baseline and low platelets. Pt underwent an . She experienced increased bleeding postpartum (several hours after delivery) and hypotension briefly. She was stable with uterine exploration, additional IV fluids, IV dilute pitocin and placement of a bladder moralez overnight. She had postpartum anemia that was stable. Her platelets were low prior to delivery. and were not accurately reported postpartum because of clumping.   Her BP were labile but improved postpartum and she was continued on her aldomet BP medication postpartum. Patient Instructions:   Cannot display discharge medications since this patient is not currently admitted. No current facility-administered medications for this encounter. Current Outpatient Medications:     ibuprofen (MOTRIN) 600 mg tablet, Take 1 Tab by mouth every six (6) hours as needed for Pain., Disp: 30 Tab, Rfl: 1    methyldopa (ALDOMET) 250 mg tablet, Take 1 Tab by mouth three (3) times daily. , Disp: 90 Tab, Rfl: 3    PNV ON.20/LGMMPGW fum/folic ac (PRENATAL PO), Take 1 Tab by mouth daily. , Disp: , Rfl:     albuterol (PROVENTIL HFA, VENTOLIN HFA, PROAIR HFA) 90 mcg/actuation inhaler, Take 2 Puffs by inhalation every four (4) hours as needed for Wheezing., Disp: , Rfl:     Facility-Administered Medications Ordered in Other Encounters:     lidocaine-EPINEPHrine (PF) (XYLOCAINE) 2 %-1:200,000 injection, , Epidural, PRN, David Serrano MD, 3 mL at 06/19/20 0846    bupivacaine (PF) (MARCAINE) 0.25 % (2.5 mg/mL) injection, , Epidural, PRN, David Serrano MD, 5 mL at 06/19/20 0846      WBC   Date Value Ref Range Status   06/20/2020 13.1 (H) 3.6 - 11.0 K/uL Final     RBC   Date Value Ref Range Status   06/20/2020 3.01 (L) 3.80 - 5.20 M/uL Final     HGB   Date Value Ref Range Status   06/20/2020 8.5 (L) 11.5 - 16.0 g/dL Final     Comment:     INVESTIGATED PER DELTA CHECK PROTOCOL     HCT   Date Value Ref Range Status   06/20/2020 26.0 (L) 35.0 - 47.0 % Final     PLATELET   Date Value Ref Range Status   06/20/2020  150 - 400 K/uL Final    UNABLE TO REPORT ACCURATE COUNT DUE TO PLATELET AGGREGATION, HOWEVER, PLATELETS APPEAR NORMAL IN NUMBER ON SMEAR. PLEASE RESUBMIT SODIUM CITRATE (BLUE) AND EDTA (LAVENDER) TUBES FOR HEMATOLOGICAL TESTING. Hgb, External   Date Value Ref Range Status   04/07/2020 11.7  Final     Hct, External   Date Value Ref Range Status   04/07/2020 36.8  Final       Reference my discharge instructions.       Signed By: Paulo Omalley MD     June 22, 2020

## 2020-06-22 NOTE — L&D DELIVERY NOTE
Delivery Summary    Patient: Marlen Rodrigez MRN: [id number]  [id number]: [id number]    YOB: 1990  Age: 26 y.o. Sex: female       Information for the patient's :  Verónica Galvan [[id number]]       Labor Events:    Labor: No    Steroids: None   Cervical Ripening Date/Time:       Cervical Ripening Type: Castellon/EASI   Antibiotics During Labor: No   Rupture Identifier:      Rupture Date/Time: 2020 7:34 AM   Rupture Type:     Amniotic Fluid Volume:      Amniotic Fluid Description: Clear    Amniotic Fluid Odor: None    Induction: Castellon Bulb (balloon)       Induction Date/Time: 2020 11:30 AM    Indications for Induction: Hypertension    Augmentation: None   Augmentation Date/Time:      Indications for Augmentation:     Labor complications: None       Additional complications:        Delivery Events:  Indications For Episiotomy:     Episiotomy: None   Perineal Laceration(s): None   Repaired:     Periurethral Laceration Location: right    Repaired: No    Labial Laceration Location:     Repaired:     Sulcal Laceration Location:     Repaired:     Vaginal Laceration Location:     Repaired:     Cervical Laceration Location:     Repaired:     Repair Suture: None   Number of Repair Packets:     Estimated Blood Loss (ml):  ml   Quantitative Blood Loss (ml)                Delivery Date: 2020    Delivery Time: 1:05 PM  Delivery Type: Vaginal, Spontaneous  Sex:  Female    Gestational Age: 38w3d   Delivery Clinician:  Elpidio Marley  Living Status: Living   Delivery Location: L&D            APGARS  One minute Five minutes Ten minutes   Skin color: 0   1        Heart rate: 2   2        Grimace: 2   2        Muscle tone: 2   2        Breathin   2        Totals: 8   9            Presentation: Vertex    Position:   Occiput Anterior  Resuscitation Method:  Suctioning-bulb; Tactile Stimulation     Meconium Stained:        Cord Information: 3 Vessels  Complications: None  Cord around: Delayed cord clamping? No  Cord clamped date/time:   Disposition of Cord Blood: Lab    Blood Gases Sent?: No    Placenta:  Date/Time: 2020  1:21 PM  Removal: Expressed      Appearance: Normal     Rydal Measurements:  Birth Weight: 7 lb 6.5 oz (3.36 kg)      Birth Length: 1' 8\" (0.508 m)      Head Circumference: 1' 2.17\" (0.36 m)      Chest Circumference: 1' 0.99\" (0.33 m)     Abdominal Girth: 1' 0.21\" (0.31 m)    Other Providers:   CHRISTIANO BRUNSON;ARMINDA COCHRAN;EDWAR ANN;Cyrus SHAH, Obstetrician;Primary Nurse;Primary Rydal Nurse;Charge Nurse           Group B Strep:   Lab Results   Component Value Date/Time    GrBStrep, External Negative 2020     Information for the patient's :  Brittnee Norwood [[id number]]     Lab Results   Component Value Date/Time    ABO/Rh(D) O POSITIVE 2020 02:28 PM    SAADIA IgG NEG 2020 02:28 PM    Bilirubin if SAADIA pos: IF DIRECT LORIE POSITIVE, BILIRUBIN TO FOLLOW 2020 02:28 PM     No results for input(s): PCO2CB, PO2CB, HCO3I, SO2I, IBD, PTEMPI, SPECTI, PHICB, ISITE, IDEV, IALLEN in the last 72 hours.  Delivery Note    Patient C/C/+2, pushed over intact perineum. LBFI  Delayed cord clamping not performed because of short cord and desire to place baby skin to skin. Spontaneous placenta delivery. Laceration repaired: none: right periurethral laceration, hemostatic, not repaired.     cc

## 2020-06-23 ENCOUNTER — TELEPHONE (OUTPATIENT)
Dept: OBGYN CLINIC | Age: 30
End: 2020-06-23

## 2020-06-23 ENCOUNTER — OFFICE VISIT (OUTPATIENT)
Dept: CARDIOLOGY CLINIC | Age: 30
End: 2020-06-23

## 2020-06-23 VITALS
WEIGHT: 134 LBS | BODY MASS INDEX: 21.03 KG/M2 | HEIGHT: 67 IN | SYSTOLIC BLOOD PRESSURE: 110 MMHG | OXYGEN SATURATION: 94 % | HEART RATE: 91 BPM | DIASTOLIC BLOOD PRESSURE: 60 MMHG

## 2020-06-23 DIAGNOSIS — G90.A POTS (POSTURAL ORTHOSTATIC TACHYCARDIA SYNDROME): ICD-10-CM

## 2020-06-23 DIAGNOSIS — D64.9 NORMOCYTIC ANEMIA: ICD-10-CM

## 2020-06-23 DIAGNOSIS — O10.919 ESSENTIAL HYPERTENSION IN PREGNANCY: ICD-10-CM

## 2020-06-23 DIAGNOSIS — R00.2 PALPITATIONS: Primary | ICD-10-CM

## 2020-06-23 NOTE — TELEPHONE ENCOUNTER
Patient called this morning. 26 yr old  last seen 2020. Patient states she delivered on friday. States she got a tear in her urethra. She says now its painful,bleeding and itches when she urinates. She would like to know if she could get an antibiotic if its possible. She states she just didn't want it to get worse or infected. Please advise. Thank you!

## 2020-06-23 NOTE — TELEPHONE ENCOUNTER
Called patient this morning. Advised her per MD she would not recommend an antibiotic, but wash bottle/spay area with water and then if bleeding can hold pressure with something clean and dry for up to 5 minutes. If NI: we can repair it (OV). Patient verbalized understanding.

## 2020-06-23 NOTE — TELEPHONE ENCOUNTER
I would not recommend an antibiotic, but I would recommend a wash bottle/spay area with water and then if bleeding can hold pressure with something clean and dry for up to 5 minutes. If NI: we can repair it (OV).     Israel Guadalupe MD

## 2020-06-23 NOTE — PROGRESS NOTES
[facility] [address], [address] [facility], [address] [place] [address]    Office [phone],ANE [phone]           Loraine Jaffe is a 26 y.o. female presents for evaluation of palpitations. Consult requested by JOHN Tan      Assessment/Recommendations:    ICD-10-CM ICD-9-CM    1. Palpitations R00.2 785.1 AMB POC EKG ROUTINE W/ 12 LEADS, INTER & REP      CBC W/O DIFF      TSH 3RD GENERATION      METANEPHRINES, PLASMA      CATECHOLAMINES, PLASMA   2. POTS (postural orthostatic tachycardia syndrome) I49.8 427.89    3. Normocytic anemia D64.9 285.9    4. Essential hypertension in pregnancy O10.919 642.00      Palpitations significantly increased over last few weeks of pregnancy and since delivery. I suspect she is having symptomatic PACs or PVCs. This is exacerbated currently due to sleep deprivation due to the care of a new infant. I suspect her palpitations will improve as her stress levels decrease. Hold AV nenita agents at this time. - 48-hour Holter monitor to follow-up palpitations. -TSH, plasma metanephrine and catecholamines as below  -Echocardiogram to assure no chamber or valvular pathology    Pots appears she has had some degree of orthostatic related symptoms for quite some time. This appears to be exacerbated during pregnancy. She reports having increased dizziness and near syncopal episodes during summer months. Advised importance of hydration and sodium intake. Vies on importance of compression stockings. Labile hypertension-reports having labile hypertension for a number of years. She also had severe hypertension during the final trimester of her pregnancy.   She was on bedrest for the final 4 weeks due to severe hypertension  -Repeat TSH, normal  April 2020  -Plasma metanephrines and catecholamines to assure she is not having any adrenal related issue causing labile hypertension and palpitations    Myocytic anemialikely acute blood loss anemia related to delivery. Hemoglobin was 8.5 approximately 1 week ago. Will repeat at this time since we are obtaining labs to assure the anemia is not contributing to her ongoing symptoms      Primary Care Physician- Denice Mayer MD    Follow-up following above testing        Subjective:  26 y.o. presents for evaluation of palpitations. She recently delivered her first child several weeks ago. Over the last several weeks of pregnancy and since delivery she has noticed increased palpitations. This is worse in the evening and when she is trying to sleep at night. Worse when she is in the left lateral decubitus position. She feels \"like my heart is skipping a beat or 2\". Note sustained fast heartbeats. No chest pain or shortness of breath with symptoms. She has noticed that she is more tired since delivery. She has been able to finish into yoga classes since delivering her child. Her pregnancy was complicated by hypertension. She was on bedrest for the last several weeks of pregnancy. She reports having labile blood pressures for most of her life. She also reports near syncopal episodes with frequent dizziness. The symptoms have been presents for a number years that were exacerbated with pregnancy. She notices that her orthostatic symptoms are much worse in the summer and with heat. No significant family history of sudden cardiac death. Father has atrial fibrillation. No family history of coronary artery disease.     Past Medical History:   Diagnosis Date    Asthma     Herniation of intervertebral disc of thoracolumbar region     Hypertension     Pap smear for cervical cancer screening 12/02/2019    negative, HPV negative    TMJ condylar resorption         Past Surgical History:   Procedure Laterality Date    HX APPENDECTOMY      HX APPENDECTOMY  2000    HX OTHER SURGICAL      HX TONSIL AND ADENOIDECTOMY      HX TONSILLECTOMY  2000    HX WISDOM TEETH EXTRACTION Current Outpatient Medications:     ibuprofen (MOTRIN) 600 mg tablet, Take 1 Tab by mouth every six (6) hours as needed for Pain., Disp: 30 Tab, Rfl: 1    albuterol (PROVENTIL HFA, VENTOLIN HFA, PROAIR HFA) 90 mcg/actuation inhaler, Take 2 Puffs by inhalation every four (4) hours as needed for Wheezing., Disp: , Rfl:     methyldopa (ALDOMET) 250 mg tablet, Take 1 Tab by mouth three (3) times daily. , Disp: 90 Tab, Rfl: 3    PNV LA.14/TBNFCNS fum/folic ac (PRENATAL PO), Take 1 Tab by mouth daily. , Disp: , Rfl:     Allergies   Allergen Reactions    Latex Itching    Nut - Unspecified Anaphylaxis    Codeine Nausea and Vomiting    Shellfish Derived Hives and Itching        Family History   Problem Relation Age of Onset    Hypertension Mother     Hypertension Father        Social History     Tobacco Use    Smoking status: Never Smoker    Smokeless tobacco: Never Used   Substance Use Topics    Alcohol use: Not Currently     Frequency: Never    Drug use: Never       Review of Symptoms:  Pertinent Positive: Palpitations  Pertinent Negative:No chest pain, dyspnea on exertion, shortness of breath, orthopnea, PND    All Other systems reviewed and are negative for a Comprehensive ROS (10+)    Physical Exam    Blood pressure 110/60, pulse 91, height 5' 7\" (1.702 m), weight 134 lb (60.8 kg), SpO2 94 %, unknown if currently breastfeeding. Constitutional:  well-developed and well-nourished. No distress. HENT: Normocephalic. Eyes: No scleral icterus. Neck:  Neck supple. No JVD present. Pulmonary/Chest: Effort normal and breath sounds normal. No respiratory distress, wheezes or rales. Cardiovascular: Normal rate, regular rhythm, S1 S2 . Exam reveals no gallop and no friction rub. No murmur heard. No edema. Extremities:  Normal muscle tone  Abdominal:   No abnormal distension. Neurological:  Moving all extremities, cranial nerves appear grossly intact. Skin: Skin is not cold. Not diaphoretic.  No erythema. Psychiatric:  Grossly normal mood and affect. Intact insight. Objective Data:    EC2020 normal sinus rhythm, normal EKG                Syeda Pritchard DO          ATTENTION:   This medical record was transcribed using an electronic medical records/speech recognition system. Although proofread, it may and can contain electronic, spelling and other errors. Corrections may be executed at a later time. Please feel free to contact us for any clarifications as needed.

## 2020-06-23 NOTE — PROGRESS NOTES
Daryl Mckay is a 26 y.o. female    Chief Complaint   Patient presents with    New Patient     per Dr Christina Perkins     Patient delivered a baby girl on Friday. Patient states heart racing. Chest pain patient states some chest pain     SOB patient some SOB    Dizziness patient states some dizziness when her BP drops    Swelling No    Refills No    Visit Vitals  /60 (BP 1 Location: Left arm, BP Patient Position: Sitting)   Pulse 91   Ht 5' 7\" (1.702 m)   Wt 134 lb (60.8 kg)   SpO2 94%   BMI 20.99 kg/m²       1. Have you been to the ER, urgent care clinic since your last visit? Hospitalized since your last visit? Feb 2020 and May 2020 [facility]    2. Have you seen or consulted any other health care providers outside of the [address] [place] [address] since your last visit? Include any pap smears or colon screening.   No

## 2020-06-24 ENCOUNTER — CLINICAL SUPPORT (OUTPATIENT)
Dept: CARDIOLOGY CLINIC | Age: 30
End: 2020-06-24

## 2020-06-24 DIAGNOSIS — R00.2 PALPITATION: Primary | ICD-10-CM

## 2020-06-25 ENCOUNTER — TELEPHONE (OUTPATIENT)
Dept: CARDIOLOGY CLINIC | Age: 30
End: 2020-06-25

## 2020-06-25 DIAGNOSIS — R00.2 PALPITATIONS: ICD-10-CM

## 2020-06-25 DIAGNOSIS — R00.2 PALPITATIONS: Primary | ICD-10-CM

## 2020-06-25 NOTE — TELEPHONE ENCOUNTER
Patient stated that she needs to speak with someone in regards to her lab orders. She was advised by [facility] that only a doctor can draw certain labs. Please advise.     Phone #: [phone]  Thanks

## 2020-06-25 NOTE — TELEPHONE ENCOUNTER
Called patient her labs needed to be reordered for patient to go to [facility] instead of [facility].     Reordered labs to go to [address] [place] [address]

## 2020-07-05 LAB
DOPAMINE SERPL-MCNC: 30 PG/ML (ref 0–48)
EPINEPH PLAS-MCNC: 54 PG/ML (ref 0–62)
ERYTHROCYTE [DISTWIDTH] IN BLOOD BY AUTOMATED COUNT: 13 % (ref 11.7–15.4)
HCT VFR BLD AUTO: 35.4 % (ref 34–46.6)
HGB BLD-MCNC: 11.8 G/DL (ref 11.1–15.9)
MCH RBC QN AUTO: 28.2 PG (ref 26.6–33)
MCHC RBC AUTO-ENTMCNC: 33.3 G/DL (ref 31.5–35.7)
MCV RBC AUTO: 85 FL (ref 79–97)
METANEPH FREE SERPL-MCNC: 34.4 PG/ML (ref 0–88)
MORPHOLOGY BLD-IMP: NORMAL
NOREPINEPH PLAS-MCNC: 493 PG/ML (ref 0–874)
NORMETANEPHRINE SERPL-MCNC: 90.4 PG/ML (ref 0–110.1)
PLATELET # BLD AUTO: NORMAL X10E3/UL
RBC # BLD AUTO: 4.18 X10E6/UL (ref 3.77–5.28)
TSH SERPL DL<=0.005 MIU/L-ACNC: 0.75 UIU/ML (ref 0.45–4.5)
WBC # BLD AUTO: 8.4 X10E3/UL (ref 3.4–10.8)

## 2020-07-06 ENCOUNTER — DOCUMENTATION ONLY (OUTPATIENT)
Dept: CARDIOLOGY CLINIC | Age: 30
End: 2020-07-06

## 2020-07-06 NOTE — PROGRESS NOTES
HISTORY OF PRESENT ILLNESS Godfrey Vicente is a 26 y.o. female. HPI 
 
ROS Physical Exam 
 
ASSESSMENT and PLAN 
{ASSESSMENT/PLAN:44444}

## 2020-07-07 ENCOUNTER — OFFICE VISIT (OUTPATIENT)
Dept: CARDIOLOGY CLINIC | Age: 30
End: 2020-07-07

## 2020-07-07 VITALS
BODY MASS INDEX: 20.72 KG/M2 | HEIGHT: 67 IN | SYSTOLIC BLOOD PRESSURE: 106 MMHG | WEIGHT: 132 LBS | OXYGEN SATURATION: 98 % | DIASTOLIC BLOOD PRESSURE: 70 MMHG | HEART RATE: 80 BPM

## 2020-07-07 DIAGNOSIS — G90.A POTS (POSTURAL ORTHOSTATIC TACHYCARDIA SYNDROME): ICD-10-CM

## 2020-07-07 DIAGNOSIS — I49.1 PREMATURE ATRIAL COMPLEXES: Primary | ICD-10-CM

## 2020-07-07 NOTE — PROGRESS NOTES
[facility] [address], [address] [facility], [address] [place] [address]    Office [phone],[facility] [phone]           Dylon Carter is a 26 y.o. female presents for evaluation of palpitations. Assessment/Recommendations:    ICD-10-CM ICD-9-CM    1. Premature atrial complexes I49.1 427.61    2. POTS (postural orthostatic tachycardia syndrome) I49.8 427.89      PACs likely recently exacerbated by sleep deprivation with a . Symptoms are improving. We will withhold AV nenita agents at this time. POTS-continue aggressive loading with hydration. Continue discussion of conservative measures for treatment of her orthostatic tachycardic symptoms      Anemiaimproving      Primary Care Physician- Risa Chavez MD    Follow-up 6 months        Subjective:  26 y.o. presents for follow-up evaluation. Her monitor showed evidence of PACs. Echocardiogram performed the office today shows normal LV function no significant valvular pathology. She is sleeping better with improvement in her PACs. She is attempting to increase her salt intake and hydration. She continues to have mild tachycardia when she is standing for prolonged periods of time or when she is taking a hot shower. Continues to be tired when she is walking with her . Recent laboratory data showed TSH within normal limits. No significant evidence of adrenal abnormalities. Hemoglobin has improved.     Past Medical History:   Diagnosis Date    Asthma     Herniation of intervertebral disc of thoracolumbar region     Hypertension     Pap smear for cervical cancer screening 2019    negative, HPV negative    TMJ condylar resorption         Past Surgical History:   Procedure Laterality Date    HX APPENDECTOMY      HX APPENDECTOMY      HX OTHER SURGICAL      HX TONSIL AND ADENOIDECTOMY      HX TONSILLECTOMY      HX WISDOM TEETH EXTRACTION           Current Outpatient Medications:     ibuprofen (MOTRIN) 600 mg tablet, Take 1 Tab by mouth every six (6) hours as needed for Pain., Disp: 30 Tab, Rfl: 1    albuterol (PROVENTIL HFA, VENTOLIN HFA, PROAIR HFA) 90 mcg/actuation inhaler, Take 2 Puffs by inhalation every four (4) hours as needed for Wheezing., Disp: , Rfl:     methyldopa (ALDOMET) 250 mg tablet, Take 1 Tab by mouth three (3) times daily. , Disp: 90 Tab, Rfl: 3    PNV PN.31/OLJJXIT fum/folic ac (PRENATAL PO), Take 1 Tab by mouth daily. , Disp: , Rfl:     Allergies   Allergen Reactions    Latex Itching    Nut - Unspecified Anaphylaxis    Codeine Nausea and Vomiting    Shellfish Derived Hives and Itching        Family History   Problem Relation Age of Onset    Hypertension Mother     Hypertension Father        Social History     Tobacco Use    Smoking status: Never Smoker    Smokeless tobacco: Never Used   Substance Use Topics    Alcohol use: Not Currently     Frequency: Never    Drug use: Never       Review of Symptoms:  Pertinent Positive: Palpitations improved  Pertinent Negative:No chest pain, dyspnea on exertion, shortness of breath, orthopnea, PND    All Other systems reviewed and are negative for a Comprehensive ROS (10+)    Physical Exam    Blood pressure 106/70, pulse 80, height 5' 7\" (1.702 m), weight 132 lb (59.9 kg), SpO2 98 %, unknown if currently breastfeeding. Constitutional:  well-developed and well-nourished. No distress. HENT: Normocephalic. Eyes: No scleral icterus. Neck:  Neck supple. No JVD present. Pulmonary/Chest: Effort normal and breath sounds normal. No respiratory distress, wheezes or rales. Cardiovascular: Normal rate, regular rhythm, S1 S2 . Exam reveals no gallop and no friction rub. No murmur heard. No edema. Extremities:  Normal muscle tone  Abdominal:   No abnormal distension. Neurological:  Moving all extremities, cranial nerves appear grossly intact. Skin: Skin is not cold. Not diaphoretic. No erythema. Psychiatric:  Grossly normal mood and affect. Intact insight. Objective Data:    EC2020 normal sinus rhythm, normal EKG    Holter monitor 2020 symptomatic PACs, no sustained dysrhythmias    20   ECHO ADULT COMPLETE 2020    Narrative · Normal cavity size, wall thickness, systolic function (ejection fraction   normal) and diastolic function. Estimated left ventricular ejection   fraction is 60 - 65%. No regional wall motion abnormality noted. Technically challenging study due to breast feeding and chest tenderness. Signed by: Adams Holly, DO Lola Barrientos, DO          ATTENTION:   This medical record was transcribed using an electronic medical records/speech recognition system. Although proofread, it may and can contain electronic, spelling and other errors. Corrections may be executed at a later time. Please feel free to contact us for any clarifications as needed.

## 2020-07-07 NOTE — PROGRESS NOTES
Chief Complaint   Patient presents with    Follow-up     Patient presents today for a follow up visit for Palpitations & POTS with an Echo     Visit Vitals  /70 (BP 1 Location: Left arm, BP Patient Position: Sitting)   Pulse 80   Ht 5' 7\" (1.702 m)   Wt 132 lb (59.9 kg)   SpO2 98%   BMI 20.67 kg/m²         Patient had an Echo done today    Chest pain - denied  SOB - at times when walking, upstairs  Dizziness denied  Swelling/Edema - denied  Recent hospital visit denied

## 2020-07-15 ENCOUNTER — TELEPHONE (OUTPATIENT)
Dept: OBGYN CLINIC | Age: 30
End: 2020-07-15

## 2020-07-15 NOTE — TELEPHONE ENCOUNTER
Dr. Lafrances Eisenmenger, since I cancelled her old script that she never used and refused to go to [place] or have transferred , may I send in a refill for her? It looks to me that she needs an annual!  What are your thoughts?         Jacques

## 2020-07-15 NOTE — TELEPHONE ENCOUNTER
Patient of TP is out of refills of her blood pressure medication, Methyldopa (Aldomet) 250 mg take 1 tab tid. She wants to know if it has been sent to [facility].  The medication was sent in for her back March 2020 for 90 tabs 3 refills to Elliott Perdomo at [facility]. She has not checked with them, as she does not use their pharmacy.   She will call them and see if they can transfer her rx to her new [facility].

## 2020-07-15 NOTE — TELEPHONE ENCOUNTER
I spoke with [facility] and indeed they did receive the medication order on 3/10/20 and the patient never got the RX filled. She is now wanting to send this rx to [facility].     I did get the [facility] at [facility] to cancel that request.

## 2020-07-15 NOTE — TELEPHONE ENCOUNTER
07/15/20- I verified that the chart is under TP currently responsible, will wait for her reply. Just making sure I did not accidentally close chart out without sending. DISCHARGE

## 2020-07-16 RX ORDER — METHYLDOPA 250 MG/1
250 TABLET, FILM COATED ORAL 3 TIMES DAILY
Qty: 90 TAB | Refills: 0 | Status: SHIPPED | OUTPATIENT
Start: 2020-07-16 | End: 2020-08-12 | Stop reason: SDUPTHER

## 2020-07-16 NOTE — TELEPHONE ENCOUNTER
rx has been sent and confirmed receipt.    Patient has been advised and we set her up for 8/13/20 at 2:20 pm with TP

## 2020-08-12 NOTE — PATIENT INSTRUCTIONS
After Your Delivery (the Postpartum Period): Care Instructions  Your Care Instructions    Congratulations on the birth of your baby. Like pregnancy, the  period can be a time of excitement, matias, and exhaustion. You may look at your wondrous little baby and feel happy. You may also be overwhelmed by your new sleep hours and new responsibilities. At first, babies often sleep during the days and are awake at night. They do not have a pattern or routine. They may make sudden gasps, jerk themselves awake, or look like they have crossed eyes. These are all normal, and they may even make you smile. In these first weeks after delivery, try to take good care of yourself. It may take 4 to 6 weeks to feel like yourself again, and possibly longer if you had a  birth. You will likely feel very tired for several weeks. Your days will be full of ups and downs, but lots of matias as well. Follow-up care is a key part of your treatment and safety. Be sure to make and go to all appointments, and call your doctor if you are having problems. It's also a good idea to know your test results and keep a list of the medicines you take. How can you care for yourself at home? Take care of your body after delivery  · Use pads instead of tampons for the bloody flow that may last as long as 2 weeks. · Ease cramps with ibuprofen (Advil, Motrin). · Ease soreness of hemorrhoids and the area between your vagina and rectum with ice compresses or witch hazel pads. · Ease constipation by drinking lots of fluid and eating high-fiber foods. Ask your doctor about over-the-counter stool softeners. · Cleanse yourself with a gentle squeeze of warm water from a bottle instead of wiping with toilet paper. · Take a sitz bath in warm water several times a day. · Wear a good nursing bra. Ease sore and swollen breasts with warm, wet washcloths. · If you are not breastfeeding, use ice rather than heat for breast soreness.   · Your period may not start for several months if you are breastfeeding. You may bleed more, and longer at first, than you did before you got pregnant. · Wait until you are healed (about 4 to 6 weeks) before you have sexual intercourse. Your doctor will tell you when it is okay to have sex. · Try not to travel with your baby for 5 or 6 weeks. If you take a long car trip, make frequent stops to walk around and stretch. Avoid exhaustion  · Rest every day. Try to nap when your baby naps. · Ask another adult to be with you for a few days after delivery. · Plan for  if you have other children. · Stay flexible so you can eat at odd hours and sleep when you need to. Both you and your baby are making new schedules. · Plan small trips to get out of the house. Change can make you feel less tired. · Ask for help with housework, cooking, and shopping. Remind yourself that your job is to care for your baby. Know about help for postpartum depression  · \"Baby blues\" are common for the first 1 to 2 weeks after birth. You may cry or feel sad or irritable for no reason. · Rest whenever you can. Being tired makes it harder to handle your emotions. · Go for walks with your baby. · Talk to your partner, friends, and family about your feelings. · If your symptoms last for more than a few weeks, or if you feel very depressed, ask your doctor for help. · Postpartum depression can be treated. Support groups and counseling can help. Sometimes medicine can also help. Stay healthy  · Eat healthy foods so you have more energy and lose extra baby pounds. · If you breastfeed, avoid drugs. If you quit smoking during pregnancy, try to stay smoke-free. If you choose to have a drink now and then, have only one drink, and limit the number of occasions that you have a drink. Wait to breastfeed at least 2 hours after you have a drink to reduce the amount of alcohol the baby may get in the milk.   · Start daily exercise after 4 to 6 weeks, but rest when you feel tired. · Learn exercises to tone your belly. Do Kegel exercises to regain strength in your pelvic muscles. You can do these exercises while you stand or sit. ? Squeeze the same muscles you would use to stop your urine. Your belly and thighs should not move. ? Hold the squeeze for 3 seconds, and then relax for 3 seconds. ? Start with 3 seconds. Then add 1 second each week until you are able to squeeze for 10 seconds. ? Repeat the exercise 10 to 15 times for each session. Do three or more sessions each day. · Find a class for new mothers and new babies that has an exercise time. · If you had a  birth, give yourself a bit more time before you exercise, and be careful. When should you call for help? Call  911 anytime you think you may need emergency care. For example, call if:    · You have thoughts of harming yourself, your baby, or another person.     · You passed out (lost consciousness).     · You have chest pain, are short of breath, or cough up blood.     · You have a seizure.    Call your doctor now or seek immediate medical care if:    · You have severe vaginal bleeding. This means you are passing blood clots and soaking through a pad each hour for 2 or more hours.     · You are dizzy or lightheaded, or you feel like you may faint.     · You have a fever.     · You have new or more belly pain.     · You have signs of a blood clot in your leg (called a deep vein thrombosis), such as:  ? Pain in the calf, back of the knee, thigh, or groin. ? Redness and swelling in your leg or groin.     · You have signs of preeclampsia, such as:  ? Sudden swelling of your face, hands, or feet. ? New vision problems (such as dimness, blurring, or seeing spots).   ? A severe headache.    Watch closely for changes in your health, and be sure to contact your doctor if:    · Your vaginal bleeding seems to be getting heavier.     · You have new or worse vaginal discharge.     · You feel sad, anxious, or hopeless for more than a few days.     · You do not get better as expected. Where can you learn more? Go to http://sherlyn-lei.info/  Enter A461 in the search box to learn more about \"After Your Delivery (the Postpartum Period): Care Instructions. \"  Current as of: May 29, 2019Content Version: 12.4  © 0906-8837 [employer], [employer]. Care instructions adapted under license by Dr. Z (which disclaims liability or warranty for this information). If you have questions about a medical condition or this instruction, always ask your healthcare professional. [place] [address] any warranty or liability for your use of this information.

## 2020-08-13 ENCOUNTER — OFFICE VISIT (OUTPATIENT)
Dept: OBGYN CLINIC | Age: 30
End: 2020-08-13
Payer: COMMERCIAL

## 2020-08-13 VITALS — WEIGHT: 134 LBS | BODY MASS INDEX: 20.99 KG/M2 | SYSTOLIC BLOOD PRESSURE: 124 MMHG | DIASTOLIC BLOOD PRESSURE: 90 MMHG

## 2020-08-13 DIAGNOSIS — I10 ESSENTIAL HYPERTENSION: ICD-10-CM

## 2020-08-13 PROBLEM — O13.9 GESTATIONAL HYPERTENSION AFFECTING FIRST PREGNANCY: Status: RESOLVED | Noted: 2020-06-18 | Resolved: 2020-08-13

## 2020-08-13 PROBLEM — Z34.90 PRENATAL CARE: Status: RESOLVED | Noted: 2019-12-02 | Resolved: 2020-08-13

## 2020-08-13 PROBLEM — Z34.00 PRENATAL CARE OF PRIMIGRAVIDA, ANTEPARTUM: Status: RESOLVED | Noted: 2020-05-04 | Resolved: 2020-08-13

## 2020-08-13 PROBLEM — O10.919 ESSENTIAL HYPERTENSION IN PREGNANCY: Status: RESOLVED | Noted: 2020-01-27 | Resolved: 2020-08-13

## 2020-08-13 PROCEDURE — 0503F POSTPARTUM CARE VISIT: CPT | Performed by: OBSTETRICS & GYNECOLOGY

## 2020-08-13 RX ORDER — METHYLDOPA 250 MG/1
250 TABLET, FILM COATED ORAL 3 TIMES DAILY
Qty: 90 TAB | Refills: 0 | Status: SHIPPED | OUTPATIENT
Start: 2020-08-13 | End: 2021-02-11

## 2020-08-13 NOTE — PROGRESS NOTES
Farida Chaudhari MD, [address]     Postpartum visit    Chief Complaint   Patient presents with   [facility]       Kelly Jon is a 26 y.o. female  who presents for a postpartum exam.     She is now 8 weeks from a vaginal delivery delivery. No LMP recorded. She has had the following significant problems since her delivery: none. She reports her mood as Good. The patient is breastfeeding/pumping breast milk for her baby. The patient would like to use condoms for birth control. She is due for her next AE in 3 months. Past Medical History:   Diagnosis Date    Asthma     Herniation of intervertebral disc of thoracolumbar region     Hypertension     Pap smear for cervical cancer screening 2019    negative, HPV negative    POTS (postural orthostatic tachycardia syndrome)     TMJ condylar resorption      Past Surgical History:   Procedure Laterality Date    HX APPENDECTOMY      HX APPENDECTOMY      HX OTHER SURGICAL      HX TONSIL AND ADENOIDECTOMY      HX TONSILLECTOMY      HX WISDOM TEETH EXTRACTION       Current Outpatient Medications   Medication Sig    methyldopa (ALDOMET) 250 mg tablet Take 1 Tab by mouth three (3) times daily. No further refills until post partum appointment/blood pressure check with doctor, please call for appointment per Dr. Sahhzad Hytlon    albuterol (PROVENTIL HFA, VENTOLIN HFA, PROAIR HFA) 90 mcg/actuation inhaler Take 2 Puffs by inhalation every four (4) hours as needed for Wheezing.  PNV QJ.63/PKSBAQK fum/folic ac (PRENATAL PO) Take 1 Tab by mouth daily. No current facility-administered medications for this visit.       Allergies   Allergen Reactions    Latex Itching    Nut - Unspecified Anaphylaxis    Codeine Nausea and Vomiting    Shellfish Derived Hives and Itching     Family History   Problem Relation Age of Onset    Hypertension Mother     Hypertension Father      Social History     Socioeconomic History    Marital status:      Spouse name: Not on file    Number of children: Not on file    Years of education: Not on file    Highest education level: Not on file   Occupational History    Not on file   Social Needs    Financial resource strain: Not on file    Food insecurity     Worry: Not on file     Inability: Not on file    Transportation needs     Medical: Not on file     Non-medical: Not on file   Tobacco Use    Smoking status: Never Smoker    Smokeless tobacco: Never Used   Substance and Sexual Activity    Alcohol use: Not Currently     Frequency: Never    Drug use: Never    Sexual activity: Yes     Partners: Male     Birth control/protection: None   Lifestyle    Physical activity     Days per week: Not on file     Minutes per session: Not on file    Stress: Not on file   Relationships    Social connections     Talks on phone: Not on file     Gets together: Not on file     Attends [employer] service: Not on file     Active member of club or organization: Not on file     Attends meetings of clubs or organizations: Not on file     Relationship status: Not on file    Intimate partner violence     Fear of current or ex partner: Not on file     Emotionally abused: Not on file     Physically abused: Not on file     Forced sexual activity: Not on file   Other Topics Concern     Service Not Asked    Blood Transfusions Not Asked    Caffeine Concern Not Asked    Occupational Exposure Not Asked   [place] Hazards Not Asked    Sleep Concern Not Asked    Stress Concern Not Asked    Weight Concern Not Asked    Special Diet Not Asked    Back Care Not Asked    Exercise Not Asked    Bike Helmet Not Asked    Seat Belt Not Asked    Self-Exams Not Asked   Social History Narrative    ** Merged History Encounter **          OB History        1    Para   1    Term   1       0    AB   0    Living   1       SAB   0    TAB   0    Ectopic   0    Molar   0    Multiple   0    Live Births 1          Obstetric Comments   , TRP, IOL SIPIH, low platelets, PPH (hours after delivery) managed with moralez/exploration/pitocin. Immunization History   Administered Date(s) Administered    Influenza Vaccine (Quad) PF 2019    Tdap 2020       Review of Systems:  History obtained from the patient  General ROS: negative for - chills, fever or weight loss  Respiratory ROS: no cough, shortness of breath, or wheezing  Cardiovascular ROS: no chest pain or dyspnea on exertion  Gastrointestinal ROS: negative for - appetite loss, change in bowel habits or nausea/vomiting  Genito-Urinary ROS: negative except for as noted in HPI    PHYSICAL EXAMINATION  Visit Vitals  /90   Wt 134 lb (60.8 kg)   Breastfeeding Yes   BMI 20.99 kg/m²       Constitutional  · Appearance: well-nourished, well developed, alert, in no acute distress    HENT  · Head and Face: appears normal    Neck  · Inspection/Palpation: normal appearance, no masses or tenderness  · Lymph Nodes: no lymphadenopathy present  · Thyroid: gland size normal, nontender, no nodules or masses present on palpation    Chest  clear to auscultation, no wheezes, rales or rhonchi, symmetric air entry. Cardiac/CVS  normal rate, regular rhythm, normal S1, S2, no murmurs, rubs, clicks or gallops.     Breasts  · Inspection of Breasts: breasts symmetrical, no skin changes, no discharge present, nipple appearance normal, no skin retraction present  · Palpation of Breasts and Axillae: no masses present on palpation, no breast tenderness  · Axillary Lymph Nodes: no lymphadenopathy present    Gastrointestinal  · Abdominal Examination: abdomen non-tender to palpation, normal bowel sounds, no masses present  · Liver and spleen: no hepatomegaly present, spleen not palpable  · Hernias: no hernias identified    Genitourinary  · External Genitalia: normal appearance for age, no discharge present, no tenderness present, no inflammatory lesions present, no masses present, no atrophy present  · Vagina: normal vaginal vault without central or paravaginal defects, no discharge present, no inflammatory lesions present, no masses present  · Bladder: non-tender to palpation  · Urethra: appears normal  · Cervix: normal   · Uterus: normal size, shape and consistency  · Adnexa: no adnexal tenderness present, no adnexal masses present  · Perineum: perineum within normal limits, no evidence of trauma, no rashes or skin lesions present  · Anus: anus within normal limits  · Inguinal Lymph Nodes: no lymphadenopathy present    Skin  · General Inspection: no rash, no lesions identified    Neurologic/Psychiatric  · Mental Status:  · Orientation: grossly oriented to person, place and time  · Mood and Affect: mood normal, affect appropriate    Assessment:  Normal postpartum check  Encounter Diagnoses   Name Primary?  Postpartum exam Yes    Essential hypertension    dxd w pots by cardiologist    Plan:  RTO for AE or sooner prn  Discussed contraception options; r/b/a. Planned contraception: condoms   She should return to normal activity  We recommend healthy balanced diet, regular exercise  We discussed safer sex practices, condom use and risk factors for sexually transmitted diseases.    Patient should notify MD if she cannot resume normal activity and exercise  Recommended continuing prenatal vitamins/folic acid

## 2021-02-10 NOTE — PATIENT INSTRUCTIONS
Well Visit, Ages 25 to 48: Care Instructions Your Care Instructions Physical exams can help you stay healthy. Your doctor has checked your overall health and may have suggested ways to take good care of yourself. He or she also may have recommended tests. At home, you can help prevent illness with healthy eating, regular exercise, and other steps. Follow-up care is a key part of your treatment and safety. Be sure to make and go to all appointments, and call your doctor if you are having problems. It's also a good idea to know your test results and keep a list of the medicines you take. How can you care for yourself at home? · Reach and stay at a healthy weight. This will lower your risk for many problems, such as obesity, diabetes, heart disease, and high blood pressure. · Get at least 30 minutes of physical activity on most days of the week. Walking is a good choice. You also may want to do other activities, such as running, swimming, cycling, or playing tennis or team sports. Discuss any changes in your exercise program with your doctor. · Do not smoke or allow others to smoke around you. If you need help quitting, talk to your doctor about stop-smoking programs and medicines. These can increase your chances of quitting for good. · Talk to your doctor about whether you have any risk factors for sexually transmitted infections (STIs). Having one sex partner (who does not have STIs and does not have sex with anyone else) is a good way to avoid these infections. · Use birth control if you do not want to have children at this time. Talk with your doctor about the choices available and what might be best for you. · Protect your skin from too much sun. When you're outdoors from 10 a.m. to 4 p.m., stay in the shade or cover up with clothing and a hat with a wide brim. Wear sunglasses that block UV rays. Even when it's cloudy, put broad-spectrum sunscreen (SPF 30 or higher) on any exposed skin. · See a dentist one or two times a year for checkups and to have your teeth cleaned. · Wear a seat belt in the car. Follow your doctor's advice about when to have certain tests. These tests can spot problems early. For everyone · Cholesterol. Have the fat (cholesterol) in your blood tested after age 21. Your doctor will tell you how often to have this done based on your age, family history, or other things that can increase your risk for heart disease. · Blood pressure. Have your blood pressure checked during a routine doctor visit. Your doctor will tell you how often to check your blood pressure based on your age, your blood pressure results, and other factors. · Vision. Talk with your doctor about how often to have a glaucoma test. 
· Diabetes. Ask your doctor whether you should have tests for diabetes. · Colon cancer. Your risk for colorectal cancer gets higher as you get older. Some experts say that adults should start regular screening at age 48 and stop at age 76. Others say to start before age 48 or continue after age 76. Talk with your doctor about your risk and when to start and stop screening. For women · Breast exam and mammogram. Talk to your doctor about when you should have a clinical breast exam and a mammogram. Medical experts differ on whether and how often women under 50 should have these tests. Your doctor can help you decide what is right for you. · Cervical cancer screening test and pelvic exam. Begin with a Pap test at age 24. The test often is part of a pelvic exam. Starting at age 27, you may choose to have a Pap test, an HPV test, or both tests at the same time (called co-testing). Talk with your doctor about how often to have testing. · Tests for sexually transmitted infections (STIs). Ask whether you should have tests for STIs. You may be at risk if you have sex with more than one person, especially if your partners do not wear condoms. For men · Tests for sexually transmitted infections (STIs). Ask whether you should have tests for STIs. You may be at risk if you have sex with more than one person, especially if you do not wear a condom. · Testicular cancer exam. Ask your doctor whether you should check your testicles regularly. · Prostate exam. Talk to your doctor about whether you should have a blood test (called a PSA test) for prostate cancer. Experts differ on whether and when men should have this test. Some experts suggest it if you are older than 39 and are -American or have a father or brother who got prostate cancer when he was younger than 72. When should you call for help? Watch closely for changes in your health, and be sure to contact your doctor if you have any problems or symptoms that concern you. Where can you learn more? Go to http://www.preciado.com/ Enter P072 in the search box to learn more about \"Well Visit, Ages 25 to 48: Care Instructions. \" Current as of: May 27, 2020               Content Version: 12.6 © 2006-2020 [employer]. Care instructions adapted under license by [employer] (which disclaims liability or warranty for this information). If you have questions about a medical condition or this instruction, always ask your healthcare professional. [address] any warranty or liability for your use of this information.

## 2021-02-11 ENCOUNTER — OFFICE VISIT (OUTPATIENT)
Dept: OBGYN CLINIC | Age: 31
End: 2021-02-11
Payer: COMMERCIAL

## 2021-02-11 VITALS
HEIGHT: 67 IN | BODY MASS INDEX: 21.19 KG/M2 | DIASTOLIC BLOOD PRESSURE: 87 MMHG | WEIGHT: 135 LBS | SYSTOLIC BLOOD PRESSURE: 123 MMHG

## 2021-02-11 DIAGNOSIS — Z01.419 ENCOUNTER FOR GYNECOLOGICAL EXAMINATION (GENERAL) (ROUTINE) WITHOUT ABNORMAL FINDINGS: Primary | ICD-10-CM

## 2021-02-11 DIAGNOSIS — N92.4 EXCESSIVE BLEEDING IN PREMENOPAUSAL PERIOD: ICD-10-CM

## 2021-02-11 PROCEDURE — 99395 PREV VISIT EST AGE 18-39: CPT | Performed by: OBSTETRICS & GYNECOLOGY

## 2021-02-11 NOTE — PROGRESS NOTES
[facility]  [email or web address]  [phone]    Rossy Wilson MD, FACOG       Annual Gynecologic Exam:  WWE <40  Chief Complaint   Patient presents with    Well Woman         Catalino Hdz is a 26 y.o.  WHITE OR  female who presents for an annual well woman exam.  Patient's last menstrual period was 2021 (exact date). .    She reports the following additional concerns: none. Some discomfort with intercourse when starts, has not tried lubrication  +BF. Menstrual status:  She does not report dysmenorrhea/painful menses. She does report heavy menses. 3 heavy days, 4 regular days. She does not report irregular bleeding. Sexual history and Contraception:  Social History     Substance and Sexual Activity   Sexual Activity Yes    Partners: Male    Birth control/protection: None       She does not reports new sexual partner(s) in the last year. Preventive Medicine History:  Her most recent Pap smear result: normal was obtained in 2019  Her most recent HR HPV screen was Negative obtained in     She does not have a history of YUMIKO 2, 3 or cervical cancer. Past Medical History:   Diagnosis Date    Asthma     Herniation of intervertebral disc of thoracolumbar region     Hypertension     Pap smear for cervical cancer screening 2019    negative, HPV negative    POTS (postural orthostatic tachycardia syndrome)     TMJ condylar resorption      OB History    Para Term  AB Living   1 1 1 0 0 1   SAB TAB Ectopic Molar Multiple Live Births   0 0 0 0 0 1      # Outcome Date GA Lbr Kan/2nd Weight Sex Delivery Anes PTL Lv   1 Term 20 38w5d 04:36 / 00:55 7 lb 6.5 oz (3.36 kg) F Vag-Spont EPI N ELIAS      Obstetric Comments   , TRP, IOL SIPIH, low platelets, PPH (hours after delivery) managed with moralez/exploration/pitocin.        Past Surgical History:   Procedure Laterality Date    HX APPENDECTOMY      HX APPENDECTOMY  2000   • HX OTHER SURGICAL     • HX TONSIL AND ADENOIDECTOMY     • HX TONSILLECTOMY  2000   • HX WISDOM TEETH EXTRACTION       Family History   Problem Relation Age of Onset   • Hypertension Mother    • Hypertension Father      Social History     Socioeconomic History   • Marital status:      Spouse name: Not on file   • Number of children: Not on file   • Years of education: Not on file   • Highest education level: Not on file   Occupational History   • Not on file   Social Needs   • Financial resource strain: Not on file   • Food insecurity     Worry: Not on file     Inability: Not on file   • Transportation needs     Medical: Not on file     Non-medical: Not on file   Tobacco Use   • Smoking status: Never Smoker   • Smokeless tobacco: Never Used   Substance and Sexual Activity   • Alcohol use: Not Currently     Frequency: Never   • Drug use: Never   • Sexual activity: Yes     Partners: Male     Birth control/protection: None   Lifestyle   • Physical activity     Days per week: Not on file     Minutes per session: Not on file   • Stress: Not on file   Relationships   • Social connections     Talks on phone: Not on file     Gets together: Not on file     Attends Taoism service: Not on file     Active member of club or organization: Not on file     Attends meetings of clubs or organizations: Not on file     Relationship status: Not on file   • Intimate partner violence     Fear of current or ex partner: Not on file     Emotionally abused: Not on file     Physically abused: Not on file     Forced sexual activity: Not on file   Other Topics Concern   •  Service Not Asked   • Blood Transfusions Not Asked   • Caffeine Concern Not Asked   • Occupational Exposure Not Asked   • Hobby Hazards Not Asked   • Sleep Concern Not Asked   • Stress Concern Not Asked   • Weight Concern Not Asked   • Special Diet Not Asked   • Back Care Not Asked   • Exercise Not Asked   • Bike Helmet Not Asked   •  Seat Belt Not Asked    Self-Exams Not Asked   Social History Narrative    ** Merged History Encounter **            Allergies   Allergen Reactions    Latex Itching    Nut - Unspecified Anaphylaxis    Codeine Nausea and Vomiting    Shellfish Derived Hives and Itching       Current Outpatient Medications   Medication Sig    PNV RF.12/[id number] fum/folic ac (PRENATAL PO) Take 1 Tab by mouth daily. No current facility-administered medications for this visit.         Patient Active Problem List   Diagnosis Code    Essential hypertension I10         Review of Systems - History obtained from the patient and patient filled out questionnaire   Constitutional/general, HEENT, CV, Resp, GI, MSK, Neuro, Psych, Heme/lymph, Skin, Breast ROS: no significant complaints except as noted on HPI    Physical Exam  Visit Vitals  /87 (BP 1 Location: Right arm, BP Patient Position: Sitting, BP Cuff Size: Adult)   Ht 5' 7\" (1.702 m)   Wt 135 lb (61.2 kg)   LMP 01/31/2021 (Exact Date)   Breastfeeding Yes   BMI 21.14 kg/m²       Constitutional  · Appearance: well-nourished, well developed, alert, in no acute distress    HENT  · Head and Face: appears normal    Neck  · Inspection/Palpation: normal appearance, no masses or tenderness  · Lymph Nodes: no lymphadenopathy present  · Thyroid: gland size normal, nontender, no nodules or masses present on palpation    Chest  · Respiratory Effort: breathing unlabored  · Auscultation: normal breath sounds    Cardiovascular  · Heart:  · Auscultation: regular rate and rhythm without murmur    Breasts  · Inspection of Breasts: breasts symmetrical, no skin changes, no discharge present, nipple appearance normal, no skin retraction present  · Palpation of Breasts and Axillae: no masses present on palpation, no breast tenderness  · Axillary Lymph Nodes: no lymphadenopathy present    Gastrointestinal  · Abdominal Examination: abdomen non-tender to palpation, normal bowel sounds, no masses present  · Liver and spleen: no hepatomegaly present, spleen not palpable  · Hernias: no hernias identified    Genitourinary  · External Genitalia: normal appearance for age, no discharge present, no tenderness present, no inflammatory lesions present, no masses present  · Vagina: normal vaginal vault without central or paravaginal defects, no discharge present, no inflammatory lesions present, no masses present  · Bladder: non-tender to palpation  · Urethra: appears normal  · Cervix: normal   · Uterus: normal size, shape and consistency  · Adnexa: no adnexal tenderness present, no adnexal masses present  · Perineum: perineum within normal limits, no evidence of trauma, no rashes or skin lesions present  · Anus: anus within normal limits, no hemorrhoids present  · Inguinal Lymph Nodes: no lymphadenopathy present    Skin  · General Inspection: no rash, no lesions identified    Neurologic/Psychiatric  · Mental Status:  · Orientation: grossly oriented to person, place and time  · Mood and Affect: mood normal, affect appropriate    Assessment:  26 y.o.  for well woman exam  Encounter Diagnoses   Name Primary?  Encounter for gynecological examination (general) (routine) without abnormal findings Yes    Excessive bleeding in premenopausal period        Plan:  The patient was counseled about diet, exercise, healthy lifestyle  We discussed current pap smear and HR HPV testing guidelines. I recommended follow up one year for routine annual gynecologic exam or sooner prn  Handouts were given to the patient  I recommended follow up with a primary care physician for chronic medical problems and evaluation of non-gynecologic concerns and to please contact our office with any GYN questions or concerns.   I recommended testing per CDC guidelines and at patient request.   uberlube samples  Discussed avoiding painful intercourse, generous lubrication for at lest three months  FU three months if NI  Disc topical estrogen prn  We discussed safer NSAID dosing for heavy cycles. Pt was advised to take this dose with food and not to take for more than 5 days. Disc RBA including bleeding/gastric irritation. WILL LANIER if NI to discuss other options. Recommend dietary calcium 1200mg per day, vitamin D 400-800 international units per day and daily weight bearing exercise 30 minutes per day. Folllow up:  [x] return for annual well woman exam in one year or sooner if she is having problems  [] follow up and ultrasound  [] 6 months  [] 3 months  [] 6 weeks   [] 1 month    No orders of the defined types were placed in this encounter. No results found for any visits on 02/11/21.

## 2021-10-28 ENCOUNTER — HOSPITAL ENCOUNTER (EMERGENCY)
Age: 31
Discharge: HOME | End: 2021-10-29
Attending: EMERGENCY MEDICINE
Payer: COMMERCIAL

## 2021-10-28 ENCOUNTER — APPOINTMENT (OUTPATIENT)
Dept: ULTRASOUND IMAGING | Age: 31
End: 2021-10-28
Attending: EMERGENCY MEDICINE
Payer: COMMERCIAL

## 2021-10-28 DIAGNOSIS — N93.9 EPISODE OF HEAVY VAGINAL BLEEDING: Primary | ICD-10-CM

## 2021-10-28 LAB
ALBUMIN SERPL-MCNC: 3.8 G/DL (ref 3.5–5)
ALBUMIN/GLOB SERPL: 1.2 {RATIO} (ref 1.1–2.2)
ALP SERPL-CCNC: 84 U/L (ref 45–117)
ALT SERPL-CCNC: 23 U/L (ref 12–78)
ANION GAP SERPL CALC-SCNC: 8 MMOL/L (ref 5–15)
APTT PPP: 23.7 SEC (ref 22.1–31)
AST SERPL-CCNC: 21 U/L (ref 15–37)
BASOPHILS # BLD: 0.1 K/UL (ref 0–0.1)
BASOPHILS NFR BLD: 1 % (ref 0–1)
BILIRUB SERPL-MCNC: 0.2 MG/DL (ref 0.2–1)
BUN SERPL-MCNC: 4 MG/DL (ref 6–20)
BUN/CREAT SERPL: 6 (ref 12–20)
CALCIUM SERPL-MCNC: 9.2 MG/DL (ref 8.5–10.1)
CHLORIDE SERPL-SCNC: 107 MMOL/L (ref 97–108)
CO2 SERPL-SCNC: 24 MMOL/L (ref 21–32)
COMMENT, HOLDF: NORMAL
CREAT SERPL-MCNC: 0.69 MG/DL (ref 0.55–1.02)
DIFFERENTIAL METHOD BLD: ABNORMAL
EOSINOPHIL # BLD: 0.7 K/UL (ref 0–0.4)
EOSINOPHIL NFR BLD: 7 % (ref 0–7)
ERYTHROCYTE [DISTWIDTH] IN BLOOD BY AUTOMATED COUNT: 12.2 % (ref 11.5–14.5)
GLOBULIN SER CALC-MCNC: 3.3 G/DL (ref 2–4)
GLUCOSE SERPL-MCNC: 107 MG/DL (ref 65–100)
HCG SERPL-ACNC: ABNORMAL MIU/ML (ref 0–6)
HCT VFR BLD AUTO: 35.3 % (ref 35–47)
HGB BLD-MCNC: 12.3 G/DL (ref 11.5–16)
IMM GRANULOCYTES # BLD AUTO: 0 K/UL (ref 0–0.04)
IMM GRANULOCYTES NFR BLD AUTO: 0 % (ref 0–0.5)
INR PPP: 1 (ref 0.9–1.1)
LYMPHOCYTES # BLD: 2.3 K/UL (ref 0.8–3.5)
LYMPHOCYTES NFR BLD: 23 % (ref 12–49)
MAGNESIUM SERPL-MCNC: 2.1 MG/DL (ref 1.6–2.4)
MCH RBC QN AUTO: 29.9 PG (ref 26–34)
MCHC RBC AUTO-ENTMCNC: 34.8 G/DL (ref 30–36.5)
MCV RBC AUTO: 85.7 FL (ref 80–99)
MONOCYTES # BLD: 0.9 K/UL (ref 0–1)
MONOCYTES NFR BLD: 9 % (ref 5–13)
NEUTS SEG # BLD: 6 K/UL (ref 1.8–8)
NEUTS SEG NFR BLD: 61 % (ref 32–75)
NRBC # BLD: 0 K/UL (ref 0–0.01)
NRBC BLD-RTO: 0 PER 100 WBC
PLATELET # BLD AUTO: 204 K/UL (ref 150–400)
PMV BLD AUTO: 12.2 FL (ref 8.9–12.9)
POTASSIUM SERPL-SCNC: 3.8 MMOL/L (ref 3.5–5.1)
PROT SERPL-MCNC: 7.1 G/DL (ref 6.4–8.2)
PROTHROMBIN TIME: 10.7 SEC (ref 9–11.1)
RBC # BLD AUTO: 4.12 M/UL (ref 3.8–5.2)
SAMPLES BEING HELD,HOLD: NORMAL
SODIUM SERPL-SCNC: 139 MMOL/L (ref 136–145)
THERAPEUTIC RANGE,PTTT: NORMAL SECS (ref 58–77)
WBC # BLD AUTO: 9.9 K/UL (ref 3.6–11)

## 2021-10-28 PROCEDURE — 74011250636 HC RX REV CODE- 250/636: Performed by: NURSE PRACTITIONER

## 2021-10-28 PROCEDURE — 99283 EMERGENCY DEPT VISIT LOW MDM: CPT

## 2021-10-28 PROCEDURE — 83735 ASSAY OF MAGNESIUM: CPT

## 2021-10-28 PROCEDURE — 36415 COLL VENOUS BLD VENIPUNCTURE: CPT

## 2021-10-28 PROCEDURE — 85730 THROMBOPLASTIN TIME PARTIAL: CPT

## 2021-10-28 PROCEDURE — 85018 HEMOGLOBIN: CPT

## 2021-10-28 PROCEDURE — 86901 BLOOD TYPING SEROLOGIC RH(D): CPT

## 2021-10-28 PROCEDURE — 80053 COMPREHEN METABOLIC PANEL: CPT

## 2021-10-28 PROCEDURE — 96361 HYDRATE IV INFUSION ADD-ON: CPT

## 2021-10-28 PROCEDURE — 85610 PROTHROMBIN TIME: CPT

## 2021-10-28 PROCEDURE — 76817 TRANSVAGINAL US OBSTETRIC: CPT

## 2021-10-28 PROCEDURE — 85025 COMPLETE CBC W/AUTO DIFF WBC: CPT

## 2021-10-28 PROCEDURE — 84702 CHORIONIC GONADOTROPIN TEST: CPT

## 2021-10-28 PROCEDURE — 96360 HYDRATION IV INFUSION INIT: CPT

## 2021-10-28 RX ADMIN — SODIUM CHLORIDE 1000 ML: 9 INJECTION, SOLUTION INTRAVENOUS at 20:12

## 2021-10-28 NOTE — ED TRIAGE NOTES
Pt arrives to ED for heavy vaginal bleeding & clotting after taking  pill on Monday. States today she starting \"gushing\" blood. Pt states she feels dizzy.

## 2021-10-29 VITALS
WEIGHT: 135 LBS | DIASTOLIC BLOOD PRESSURE: 74 MMHG | HEART RATE: 77 BPM | BODY MASS INDEX: 21.19 KG/M2 | SYSTOLIC BLOOD PRESSURE: 122 MMHG | OXYGEN SATURATION: 98 % | RESPIRATION RATE: 16 BRPM | TEMPERATURE: 97.5 F | HEIGHT: 67 IN

## 2021-10-29 LAB
ABO + RH BLD: NORMAL
BLOOD GROUP ANTIBODIES SERPL: NORMAL
HCT VFR BLD AUTO: 29.8 % (ref 35–47)
HGB BLD-MCNC: 10.3 G/DL (ref 11.5–16)
SPECIMEN EXP DATE BLD: NORMAL

## 2021-10-29 PROCEDURE — 74011250637 HC RX REV CODE- 250/637: Performed by: EMERGENCY MEDICINE

## 2021-10-29 RX ORDER — HYDROCODONE BITARTRATE AND ACETAMINOPHEN 5; 325 MG/1; MG/1
1 TABLET ORAL
Qty: 12 TABLET | Refills: 0 | Status: SHIPPED | OUTPATIENT
Start: 2021-10-29 | End: 2021-11-01

## 2021-10-29 RX ORDER — FERROUS SULFATE 324(65)MG
324 TABLET, DELAYED RELEASE (ENTERIC COATED) ORAL
Qty: 30 TABLET | Refills: 1 | Status: SHIPPED | OUTPATIENT
Start: 2021-10-29

## 2021-10-29 RX ORDER — METHYLERGONOVINE MALEATE 0.2 MG/1
200 TABLET ORAL ONCE
Status: COMPLETED | OUTPATIENT
Start: 2021-10-29 | End: 2021-10-29

## 2021-10-29 RX ORDER — NAPROXEN 500 MG/1
500 TABLET ORAL 2 TIMES DAILY WITH MEALS
Qty: 20 TABLET | Refills: 0 | Status: SHIPPED | OUTPATIENT
Start: 2021-10-29 | End: 2021-11-08

## 2021-10-29 RX ORDER — METHYLERGONOVINE MALEATE 0.2 MG/1
200 TABLET ORAL EVERY 6 HOURS
Status: DISCONTINUED | OUTPATIENT
Start: 2021-10-29 | End: 2021-10-29

## 2021-10-29 RX ORDER — METHYLERGONOVINE MALEATE 0.2 MG/1
0.2 TABLET ORAL 3 TIMES DAILY
Qty: 5 TABLET | Refills: 0 | Status: SHIPPED | OUTPATIENT
Start: 2021-10-29 | End: 2021-10-31

## 2021-10-29 RX ADMIN — METHYLERGONOVINE 200 MCG: 0.2 TABLET ORAL at 01:10

## 2021-10-29 NOTE — ED PROVIDER NOTES
Patient is a 20-year-old female with past medical history significant for POTS, asthma, hypertension who presents to emergency department for evaluation of vaginal bleeding. She states that she took methotrexate on Monday to terminate a 5-week pregnancy which was documented as an IUP on ultrasound. She states late that night she had heavy bleeding but then seemed to calm down and had only spotting the next day. She states this afternoon however she started to have significant bleeding which she describes as a gushing of thin red blood with soaking of her overnight pads every 15 minutes or so. She states she has felt lightheaded. Denies any syncopal events. Denies any chest pain or shortness of breath other than just which she associates with being anxious about the event. Denies any history of bleeding disorder. Patient states that she is followed by her local OB/GYN here at [facility]. [place] any significant pelvic pain other than some mild cramping.            Past Medical History:   Diagnosis Date    Asthma     Herniation of intervertebral disc of thoracolumbar region     Hypertension     Pap smear for cervical cancer screening 12/02/2019    negative, HPV negative    POTS (postural orthostatic tachycardia syndrome)     TMJ condylar resorption        Past Surgical History:   Procedure Laterality Date    HX APPENDECTOMY      HX APPENDECTOMY  2000    HX OTHER SURGICAL      HX TONSIL AND ADENOIDECTOMY      HX TONSILLECTOMY  2000    HX WISDOM TEETH EXTRACTION           Family History:   Problem Relation Age of Onset    Hypertension Mother     Hypertension Father        Social History     Socioeconomic History    Marital status:      Spouse name: Not on file    Number of children: Not on file    Years of education: Not on file    Highest education level: Not on file   Occupational History    Not on file   Tobacco Use    Smoking status: Never Smoker    Smokeless tobacco: Never Used Vaping Use    Vaping Use: Never used   Substance and Sexual Activity    Alcohol use: Not Currently    Drug use: Never    Sexual activity: Yes     Partners: Male     Birth control/protection: None   Other Topics Concern     Service Not Asked    Blood Transfusions Not Asked    Caffeine Concern Not Asked    Occupational Exposure Not Asked    Hobby Hazards Not Asked    Sleep Concern Not Asked    Stress Concern Not Asked    Weight Concern Not Asked    Special Diet Not Asked    Back Care Not Asked    Exercise Not Asked    Bike Helmet Not Asked    Seat Belt Not Asked    Self-Exams Not Asked   Social History Narrative    ** Merged History Encounter **          Social Determinants of Health     Financial Resource Strain:     Difficulty of Paying Living Expenses:    Food Insecurity:     Worried About Running Out of Food in the Last Year:     [address] in the Last Year:    Transportation Needs:     Lack of Transportation (Medical):  Lack of Transportation (Non-Medical):    Physical Activity:     Days of Exercise per Week:     Minutes of Exercise per Session:    Stress:     Feeling of Stress :    Social Connections:     Frequency of Communication with Friends and Family:     Frequency of Social Gatherings with Friends and Family:     Attends Spiritism Services:     Active Member of Clubs or Organizations:     Attends Club or Organization Meetings:     Marital Status:    Intimate Partner Violence:     Fear of Current or Ex-Partner:     Emotionally Abused:     Physically Abused:     Sexually Abused: ALLERGIES: Latex, Nut - unspecified, Codeine, and Shellfish derived    Review of Systems   Constitutional: Positive for fatigue. Negative for fever. HENT: Negative for drooling. Eyes: Negative for photophobia. Respiratory: Negative for shortness of breath. Cardiovascular: Negative for chest pain and leg swelling.    Gastrointestinal: Negative for abdominal pain and vomiting. Genitourinary: Positive for pelvic pain (Minimal) and vaginal bleeding. Musculoskeletal: Negative for neck pain and neck stiffness. Skin: Negative for rash. Neurological: Positive for light-headedness. Negative for seizures and syncope. Hematological: Does not bruise/bleed easily. Psychiatric/Behavioral: Negative. Vitals:    10/28/21 1957 10/28/21 2051 10/29/21 0108   BP: (!) 172/94 (!) 140/92 122/74   Pulse: 98 76 77   Resp: 16 16 16   Temp: 97.7 °F (36.5 °C)  97.5 °F (36.4 °C)   SpO2: 99% 98% 98%   Weight: 61.2 kg (135 lb)     Height: 5' 7\" (1.702 m)              Physical Exam  Vitals and nursing note reviewed. Exam conducted with a chaperone present. Constitutional:       Appearance: She is not toxic-appearing or diaphoretic. HENT:      Head: Normocephalic and atraumatic. Right Ear: External ear normal.      Left Ear: External ear normal.      Nose:      Comments: Mask in place  Eyes:      General: No scleral icterus. Conjunctiva/sclera: Conjunctivae normal.   Cardiovascular:      Rate and Rhythm: Normal rate. Pulses: Normal pulses. Heart sounds: No friction rub. Pulmonary:      Effort: Pulmonary effort is normal.      Breath sounds: Normal breath sounds. Abdominal:      Palpations: Abdomen is soft. Tenderness: There is no guarding or rebound. Genitourinary:     General: Normal vulva. Vagina: Bleeding present. Comments: Large clot sitting in vaginal vault and as such only superior aspect of cervix seen. Musculoskeletal:      Cervical back: Neck supple. Right lower leg: No edema. Left lower leg: No edema. Skin:     General: Skin is warm and dry. Coloration: Skin is pale. Neurological:      Mental Status: She is alert and oriented to person, place, and time. Psychiatric:         Mood and Affect: Mood normal.         Behavior: Behavior normal.         Thought Content:  Thought content normal.         Judgment: Judgment normal.          MDM  Number of Diagnoses or Management Options  Episode of heavy vaginal bleeding  Diagnosis management comments: Bleeding seems to have slowed down significantly in the emergency department. Case discussed with on-call OB/GYN who recommends Methergine 3 times daily for 2 days. Repeat H&H has dropped 2 g however patient did get a liter of fluid and is still in relatively good range with significant decrease in bleeding at this time. Patient given strict warning signs and symptoms to return to the emergency department and advised close follow-up with her OB/GYN within the next day or so.        Amount and/or Complexity of Data Reviewed  Clinical lab tests: reviewed and ordered  Tests in the radiology section of CPT®: reviewed and ordered  Discuss the patient with other providers: yes    Risk of Complications, Morbidity, and/or Mortality  Presenting problems: moderate    Patient Progress  Patient progress: improved         Procedures

## 2021-11-01 ENCOUNTER — PATIENT OUTREACH (OUTPATIENT)
Dept: OTHER | Age: 31
End: 2021-11-01

## 2021-11-01 NOTE — PROGRESS NOTES
HPRP progress note    Patient eligible for Diane Hull 994 care management    Received notification of discharge from [facility] on 10/29 for profuse vaginal bleeding. Contacted patient to discuss post discharge needs and offer care management services. Two patient identifiers verified. Discussed the care management program.  Patient agrees to care management services at this time.      PMH:   Past Medical History:   Diagnosis Date    Asthma     Herniation of intervertebral disc of thoracolumbar region     Hypertension     Pap smear for cervical cancer screening 12/02/2019    negative, HPV negative    POTS (postural orthostatic tachycardia syndrome)     TMJ condylar resorption        Social History:   Social History     Socioeconomic History    Marital status:      Spouse name: Not on file    Number of children: Not on file    Years of education: Not on file    Highest education level: Not on file   Occupational History    Not on file   Tobacco Use    Smoking status: Never Smoker    Smokeless tobacco: Never Used   Vaping Use    Vaping Use: Never used   Substance and Sexual Activity    Alcohol use: Not Currently    Drug use: Never    Sexual activity: Yes     Partners: Male     Birth control/protection: None   Other Topics Concern     Service Not Asked    Blood Transfusions Not Asked    Caffeine Concern Not Asked    Occupational Exposure Not Asked    Hobby Hazards Not Asked    Sleep Concern Not Asked    Stress Concern Not Asked    Weight Concern Not Asked    Special Diet Not Asked    Back Care Not Asked    Exercise Not Asked    Bike Helmet Not Asked    Seat Belt Not Asked    Self-Exams Not Asked   Social History Narrative    ** Merged History Encounter **          Social Determinants of Health     Financial Resource Strain:     Difficulty of Paying Living Expenses:    Food Insecurity:     Worried About Running Out of Food in the Last Year:     [address] in the Last Year:    Transportation Needs:     Lack of Transportation (Medical):  Lack of Transportation (Non-Medical):    Physical Activity:     Days of Exercise per Week:     Minutes of Exercise per Session:    Stress:     Feeling of Stress :    Social Connections:     Frequency of Communication with Friends and Family:     Frequency of Social Gatherings with Friends and Family:     Attends L[employer] Services:     Active Member of Clubs or Organizations:     Attends Club or Organization Meetings:     Marital Status:    Intimate Partner Violence:     Fear of Current or Ex-Partner:     Emotionally Abused:     Physically Abused:     Sexually Abused:          Patient's primary care provider relationship reviewed with patient and modified, as applicable. Care management assessment completed:      Red Flags:  Call 911 anytime you think you may need emergency care. For example, call if:    · You passed out (lost consciousness).     · You have chest pain, are short of breath, or cough up blood. Call your doctor now or seek immediate medical care if:    · You have pain that does not get better after you take pain medicine.     · You cannot pass stools or gas.     · You have bright red vaginal bleeding that soaks through a pad in an hour, or you have large clots.     · You are sick to your stomach or cannot drink fluids.     · You have symptoms of a blood clot in your leg (called a deep vein thrombosis), such as:  ? Pain in your calf, back of the knee, thigh, or groin. ? Redness and swelling in your leg or groin.     · You have signs of infection, such as:  ? Increased pain, swelling, warmth, or redness. ? Red streaks leading from the incision. ? Pus draining from the incision. ? A fever.     · You have vaginal discharge that has increased in amount or smells bad. Watch closely for any changes in your health, and be sure to contact your doctor if you have any problems.       Medications:  New Medications at Discharge: See below - new medications  Changed Medications at Discharge: none  Discontinued Medications at Discharge: none  Current Outpatient Medications   Medication Sig    ferrous sulfate 324 mg (65 mg iron) tablet Take 1 Tablet by mouth Daily (before breakfast).  naproxen (Naprosyn) 500 mg tablet Take 1 Tablet by mouth two (2) times daily (with meals) for 10 days.  HYDROcodone-acetaminophen (Norco) 5-325 mg per tablet Take 1 Tablet by mouth every six (6) hours as needed for Pain for up to 3 days. Max Daily Amount: 4 Tablets. No current facility-administered medications for this visit. There are no discontinued medications. Performed medication reconciliation with patient, and patient verbalizes understanding of administration of home medications. There were no barriers to obtaining medications identified at this time. Preventive Care     Health Maintenance   Topic Date Due    Hepatitis C Screening  Never done    COVID-19 Vaccine (2 - Pfizer 2-dose series) 05/10/2021    Flu Vaccine (1) 09/01/2021    Cervical cancer screen  12/02/2024    DTaP/Tdap/Td series (2 - Td or Tdap) 04/07/2030    Pneumococcal 0-64 years  Aged Out     CM Identified  Problems   1. Potential Learning Need  2. No follow up appointment made      Barriers/Support system:  patient    Barriers/Challenges to Care: []  Decline in memory    []  Language barrier     []  Emotional                  []  Limited mobility  []  Lack of motivation     [] Vision, hearing or cognitive impairment []  Knowledge [] Financial Barriers []  Lack of support  []  Pain []  Other [x]  None    PCP/Specialist follow up: Patient will call her OB/GYN to make a follow up appointment. States she is busy through the end of this month. No future appointments. Reviewed red flags with patient, and patient verbalizes understanding. Patient given an opportunity to ask questions. No other clinical/social/functional needs noted. The patient agrees to contact the PCP office for questions related to their healthcare. The patient expressed thanks, offered no additional questions and ended the call. Plan for next call:  Call back in three weeks, 11/22.

## 2021-11-01 NOTE — PROGRESS NOTES
Patient on report as eligible for Case Management. Left discreet message on voicemail with this CM contact information. Will attempt to contact again to offer [address] Management services. Outreach for ED visit to [facility] on 10/29 for profuse vaginal bleeding.

## 2021-11-22 ENCOUNTER — PATIENT OUTREACH (OUTPATIENT)
Dept: OTHER | Age: 31
End: 2021-11-22

## 2021-11-22 NOTE — PROGRESS NOTES
Attempt to reach patient for follow up. Discreet VM left with contact information. Will attempt another outreach in two weeks, 12/6.

## 2021-12-07 ENCOUNTER — PATIENT OUTREACH (OUTPATIENT)
Dept: OTHER | Age: 31
End: 2021-12-07

## 2021-12-07 NOTE — PROGRESS NOTES
Attempt to reach patient for follow up. Discreet VM left with contact information. Will resolve if patient does not reach back out to me in the next few weeks.

## 2021-12-21 ENCOUNTER — PATIENT OUTREACH (OUTPATIENT)
Dept: OTHER | Age: 31
End: 2021-12-21

## 2022-03-20 PROBLEM — I10 ESSENTIAL HYPERTENSION: Status: ACTIVE | Noted: 2020-04-11

## 2024-04-26 ENCOUNTER — APPOINTMENT (OUTPATIENT)
Facility: HOSPITAL | Age: 34
End: 2024-04-26
Payer: COMMERCIAL

## 2024-04-26 ENCOUNTER — HOSPITAL ENCOUNTER (EMERGENCY)
Facility: HOSPITAL | Age: 34
Discharge: HOME | End: 2024-04-26
Attending: STUDENT IN AN ORGANIZED HEALTH CARE EDUCATION/TRAINING PROGRAM
Payer: COMMERCIAL

## 2024-04-26 VITALS
HEART RATE: 69 BPM | TEMPERATURE: 98.6 F | DIASTOLIC BLOOD PRESSURE: 76 MMHG | WEIGHT: 125 LBS | BODY MASS INDEX: 19.62 KG/M2 | SYSTOLIC BLOOD PRESSURE: 138 MMHG | HEIGHT: 67 IN | RESPIRATION RATE: 16 BRPM | OXYGEN SATURATION: 100 %

## 2024-04-26 DIAGNOSIS — R42 DIZZINESS: Primary | ICD-10-CM

## 2024-04-26 LAB
ALBUMIN SERPL-MCNC: 4.4 G/DL (ref 3.5–5)
ALBUMIN/GLOB SERPL: 1.4 (ref 1.1–2.2)
ALP SERPL-CCNC: 73 U/L (ref 45–117)
ALT SERPL-CCNC: 29 U/L (ref 12–78)
ANION GAP SERPL CALC-SCNC: 4 MMOL/L (ref 5–15)
APPEARANCE UR: CLEAR
AST SERPL-CCNC: 23 U/L (ref 15–37)
BACTERIA URNS QL MICRO: NEGATIVE /HPF
BASOPHILS # BLD: 0.1 K/UL (ref 0–0.1)
BASOPHILS NFR BLD: 1 % (ref 0–1)
BILIRUB SERPL-MCNC: 0.8 MG/DL (ref 0.2–1)
BILIRUB UR QL: NEGATIVE
BUN SERPL-MCNC: 12 MG/DL (ref 6–20)
BUN/CREAT SERPL: 13 (ref 12–20)
CALCIUM SERPL-MCNC: 9.8 MG/DL (ref 8.5–10.1)
CHLORIDE SERPL-SCNC: 108 MMOL/L (ref 97–108)
CO2 SERPL-SCNC: 27 MMOL/L (ref 21–32)
COLOR UR: ABNORMAL
CREAT SERPL-MCNC: 0.9 MG/DL (ref 0.55–1.02)
DIFFERENTIAL METHOD BLD: ABNORMAL
EKG ATRIAL RATE: 63 BPM
EKG DIAGNOSIS: NORMAL
EKG P AXIS: 74 DEGREES
EKG P-R INTERVAL: 148 MS
EKG Q-T INTERVAL: 388 MS
EKG QRS DURATION: 90 MS
EKG QTC CALCULATION (BAZETT): 397 MS
EKG R AXIS: 38 DEGREES
EKG T AXIS: 54 DEGREES
EKG VENTRICULAR RATE: 63 BPM
EOSINOPHIL # BLD: 0.4 K/UL (ref 0–0.4)
EOSINOPHIL NFR BLD: 8 % (ref 0–7)
EPITH CASTS URNS QL MICRO: ABNORMAL /LPF
ERYTHROCYTE [DISTWIDTH] IN BLOOD BY AUTOMATED COUNT: 12.2 % (ref 11.5–14.5)
GLOBULIN SER CALC-MCNC: 3.2 G/DL (ref 2–4)
GLUCOSE BLD STRIP.AUTO-MCNC: 94 MG/DL (ref 65–117)
GLUCOSE SERPL-MCNC: 99 MG/DL (ref 65–100)
GLUCOSE UR STRIP.AUTO-MCNC: NEGATIVE MG/DL
HCG UR QL: NEGATIVE
HCT VFR BLD AUTO: 43.8 % (ref 35–47)
HGB BLD-MCNC: 14.9 G/DL (ref 11.5–16)
HGB UR QL STRIP: NEGATIVE
HYALINE CASTS URNS QL MICRO: ABNORMAL /LPF (ref 0–2)
IMM GRANULOCYTES # BLD AUTO: 0 K/UL (ref 0–0.04)
IMM GRANULOCYTES NFR BLD AUTO: 0 % (ref 0–0.5)
KETONES UR QL STRIP.AUTO: NEGATIVE MG/DL
LEUKOCYTE ESTERASE UR QL STRIP.AUTO: ABNORMAL
LYMPHOCYTES # BLD: 1.4 K/UL (ref 0.8–3.5)
LYMPHOCYTES NFR BLD: 29 % (ref 12–49)
MAGNESIUM SERPL-MCNC: 2.3 MG/DL (ref 1.6–2.4)
MCH RBC QN AUTO: 29.1 PG (ref 26–34)
MCHC RBC AUTO-ENTMCNC: 34 G/DL (ref 30–36.5)
MCV RBC AUTO: 85.5 FL (ref 80–99)
MONOCYTES # BLD: 0.5 K/UL (ref 0–1)
MONOCYTES NFR BLD: 9 % (ref 5–13)
NEUTS SEG # BLD: 2.6 K/UL (ref 1.8–8)
NEUTS SEG NFR BLD: 53 % (ref 32–75)
NITRITE UR QL STRIP.AUTO: NEGATIVE
NRBC # BLD: 0 K/UL (ref 0–0.01)
NRBC BLD-RTO: 0 PER 100 WBC
PH UR STRIP: 8 (ref 5–8)
PLATELET # BLD AUTO: 224 K/UL (ref 150–400)
PMV BLD AUTO: 11.5 FL (ref 8.9–12.9)
POTASSIUM SERPL-SCNC: 3.7 MMOL/L (ref 3.5–5.1)
PROT SERPL-MCNC: 7.6 G/DL (ref 6.4–8.2)
PROT UR STRIP-MCNC: NEGATIVE MG/DL
RBC # BLD AUTO: 5.12 M/UL (ref 3.8–5.2)
RBC #/AREA URNS HPF: ABNORMAL /HPF (ref 0–5)
SERVICE CMNT-IMP: NORMAL
SODIUM SERPL-SCNC: 139 MMOL/L (ref 136–145)
SP GR UR REFRACTOMETRY: 1 (ref 1–1.03)
UROBILINOGEN UR QL STRIP.AUTO: 0.2 EU/DL (ref 0.2–1)
WBC # BLD AUTO: 4.9 K/UL (ref 3.6–11)
WBC URNS QL MICRO: ABNORMAL /HPF (ref 0–4)

## 2024-04-26 PROCEDURE — 6370000000 HC RX 637 (ALT 250 FOR IP): Performed by: STUDENT IN AN ORGANIZED HEALTH CARE EDUCATION/TRAINING PROGRAM

## 2024-04-26 PROCEDURE — 81025 URINE PREGNANCY TEST: CPT

## 2024-04-26 PROCEDURE — 93005 ELECTROCARDIOGRAM TRACING: CPT | Performed by: STUDENT IN AN ORGANIZED HEALTH CARE EDUCATION/TRAINING PROGRAM

## 2024-04-26 PROCEDURE — 99284 EMERGENCY DEPT VISIT MOD MDM: CPT

## 2024-04-26 PROCEDURE — 83735 ASSAY OF MAGNESIUM: CPT

## 2024-04-26 PROCEDURE — 93010 ELECTROCARDIOGRAM REPORT: CPT | Performed by: SPECIALIST

## 2024-04-26 PROCEDURE — 85025 COMPLETE CBC W/AUTO DIFF WBC: CPT

## 2024-04-26 PROCEDURE — 82962 GLUCOSE BLOOD TEST: CPT

## 2024-04-26 PROCEDURE — 36415 COLL VENOUS BLD VENIPUNCTURE: CPT

## 2024-04-26 PROCEDURE — 70450 CT HEAD/BRAIN W/O DYE: CPT

## 2024-04-26 PROCEDURE — 80053 COMPREHEN METABOLIC PANEL: CPT

## 2024-04-26 PROCEDURE — 81001 URINALYSIS AUTO W/SCOPE: CPT

## 2024-04-26 PROCEDURE — 2580000003 HC RX 258: Performed by: STUDENT IN AN ORGANIZED HEALTH CARE EDUCATION/TRAINING PROGRAM

## 2024-04-26 RX ORDER — 0.9 % SODIUM CHLORIDE 0.9 %
1000 INTRAVENOUS SOLUTION INTRAVENOUS ONCE
Status: COMPLETED | OUTPATIENT
Start: 2024-04-26 | End: 2024-04-26

## 2024-04-26 RX ORDER — MECLIZINE HYDROCHLORIDE 25 MG/1
25 TABLET ORAL 3 TIMES DAILY PRN
Qty: 15 TABLET | Refills: 0 | Status: SHIPPED | OUTPATIENT
Start: 2024-04-26 | End: 2024-04-26

## 2024-04-26 RX ORDER — MECLIZINE HYDROCHLORIDE 25 MG/1
25 TABLET ORAL 3 TIMES DAILY PRN
Qty: 15 TABLET | Refills: 0 | Status: SHIPPED | OUTPATIENT
Start: 2024-04-26 | End: 2024-05-06

## 2024-04-26 RX ORDER — MECLIZINE HYDROCHLORIDE 25 MG/1
25 TABLET ORAL ONCE
Status: COMPLETED | OUTPATIENT
Start: 2024-04-26 | End: 2024-04-26

## 2024-04-26 RX ADMIN — SODIUM CHLORIDE 1000 ML: 9 INJECTION, SOLUTION INTRAVENOUS at 08:59

## 2024-04-26 RX ADMIN — MECLIZINE HYDROCHLORIDE 25 MG: 25 TABLET ORAL at 08:59

## 2024-04-26 ASSESSMENT — ENCOUNTER SYMPTOMS
EYE DISCHARGE: 0
NAUSEA: 0
RHINORRHEA: 0
VOMITING: 0
COUGH: 0
DIARRHEA: 0
ABDOMINAL PAIN: 0
EYE REDNESS: 0
SHORTNESS OF BREATH: 0

## 2024-04-26 ASSESSMENT — PAIN SCALES - GENERAL: PAINLEVEL_OUTOF10: 3

## 2024-04-26 ASSESSMENT — PAIN - FUNCTIONAL ASSESSMENT: PAIN_FUNCTIONAL_ASSESSMENT: 0-10

## 2024-04-26 ASSESSMENT — PAIN DESCRIPTION - LOCATION: LOCATION: HEAD

## 2024-04-26 NOTE — ED PROVIDER NOTES
[facility] EMERGENCY DEPT  EMERGENCY DEPARTMENT ENCOUNTER      Pt Name: Chloe Humphrey  MRN: [id number]  Birthdate 1990  Date of evaluation: 4/26/2024  Provider: Dorinda Ferris DO    CHIEF COMPLAINT       Chief Complaint   Patient presents with    Dizziness         HISTORY OF PRESENT ILLNESS    HPI    Chloe Humphrey is a 34 y.o. female with a history of hypertension (not on medication) who presents to the emergency department for evaluation of dizziness.  Patient reports last night developing diffuse headache described as pressure.  Also had associated dizziness which worsened this morning.  Occurs with head change and resolves with rest.  Does have a history of vertigo and endorses similar symptoms in the past.  No associated vision changes, weakness, does endorse tingling sensation in her left foot which has been present for the last 1 week.  No other paresthesias.  No difficulty ambulating.  No other recent illness.    Nursing Notes were reviewed.    REVIEW OF SYSTEMS       Review of Systems   Constitutional:  Negative for chills and fever.   HENT:  Negative for congestion and rhinorrhea.    Eyes:  Negative for discharge, redness and visual disturbance.   Respiratory:  Negative for cough and shortness of breath.    Cardiovascular:  Negative for chest pain.   Gastrointestinal:  Negative for abdominal pain, diarrhea, nausea and vomiting.   Neurological:  Positive for dizziness and headaches. Negative for speech difficulty, weakness and numbness.   Psychiatric/Behavioral:  Negative for agitation.            PAST MEDICAL HISTORY     Past Medical History:   Diagnosis Date    Asthma     Herniation of intervertebral disc of thoracolumbar region     Hypertension     Pap smear for cervical cancer screening 12/02/2019    negative, HPV negative    POTS (postural orthostatic tachycardia syndrome)     TMJ condylar resorption          SURGICAL HISTORY       Past Surgical History:   Procedure Laterality Date

## 2024-04-26 NOTE — ED TRIAGE NOTES
Patient is a 34 year old female coming from home. Patient reports that she had a headache last night. Patient reports this morning she had dizziness that increased when she turned her head. Patient has hx of vertigo, however has been a long time since she had a case. Patient alert and oriented x4, in NAD.     Signs and symptoms: Dizziness, N/V, L extremity numbness  Code Stroke activation time: 0815  Provider at bedside time:  0815  VAN score: Negative  Last Known Well (Time): 1930   Blood Glucose Result/Time:   Blood Pressure: 166/97  Anticoagulants (List medications): N/A

## 2025-03-26 NOTE — PROGRESS NOTES
[address] [facility]  [email or web address]  [phone]    Cristian Meléndez MD, [address] [facility]     Chief complaint:  Irregular cycles  Last cycle; Patient's last menstrual period was 2019 (exact date). This is a new concern and an evaluation is planned. Current pregnancy history:  Elvia Leon is a No obstetric history on file. , 30 y.o. female [facility]   She presents for the evaluation of irregular menses and a positive pregnancy test.    LMP history:  Patient's last menstrual period was 2019 (exact date). .  The date of the beginning of her last menstrual period is certain. Her menses are regular. Her cycles occur about every 4 weeks to 30 days. A urine pregnancy test was positive about 6 weeks ago. She was not using contraception at the estimated time of conception. Based on her LMP, her EGA is 10 weeks,1 days with and EDC of 2020. Ultrasound data:  She had an ultrasound today which revealed a viable beach pregnancy with a gestational age of 5 weeks and 1 days giving an EDC of 2020. A SINGLE VIABLE 11W1D IUP IS SEEN WITH NORMAL CARDIAC RHYTHM. GESTATIONAL AGE BASED ON TODAYS ULTRASOUND. A NORMAL YOLK SAC IS SEEN. RIGHT ADNEXA APPEARS WITHIN NORMAL LIMITS. LEFT ADNEXA APPEARS WITHIN NORMAL LIMITS. NO FREE FLUID SEEN IN THE CDS. Pregnancy symptoms:  She reports breast tenderness, nausea, Constipation. She denies frequent urination. Since she found out she is pregnant, she has there was no change in patient's weight. She reports her prepregnancy weight as 130 pounds. Relevant past pregnancy history:  She has the following pregnancy history:none. She does not have a history of  delivery. She does not have a history of a prior  section. Relevant past medical history:(relevant to this pregnancy): Hypertension - on Hydrochlorothiazide.      Pap smear history:  Last pap smear: 2018 - per patient  Results: Needs TB test for school.   within normal limits    Occupational history  Her occupation is: full time job doing photography. Substance history:   She does not report current tobacco use. She does not report current alcohol use. She does not report current drug use. Exposure history: There are not indoor cat(s) in the home. The patient was instructed not to change cat litter boxes during pregnancy. She does report close contact with children on a regular basis. She has chicken pox or the vaccine in the past.   Patient does not report issues with domestic violence. Genetic Screening/Teratology Counseling:   (Includes patient, baby's father, or anyone in either family with:)  3.  Patient's age >/= 28 at EDC?--29 y.o.       FOB age: 26years old. 2.  Thalassemia ([facility], [facility], [facility], or  background): MCV<80?--no  3. Neural tube defect (meningomyelocele, spina bifida, anencephaly)? --no  4. Congenital heart defect?--no  5. Down syndrome?--no  6. Justice-Sachs ([address] [place] [address])? --no  7. Canavan's Disease?--no  8. Familial Dysautonomia?--no   9. Sickle cell disease or trait ()? --no   Has she been tested for sickle trait: Unknown  10. Hemophilia or other blood disorders?--no  11. Muscular dystrophy?--no  12. Cystic fibrosis? --no  13. Liz's Chorea?--no  14. Mental retardation/autism (if yes was person tested for Fragile X)?-no  15. Other inherited genetic or chromosomal disorder?- no  16. Maternal metabolic disorder (DM, PKU, etc)? --no  17. Patient or FOB with a child with a birth defect not listed above?--no  17a. Patient or FOB with a birth defect themselves?--no  25. Recurrent pregnancy loss, or stillbirth?--no  19. Any medications since LMP other than prenatal vitamins (include vitamins, supplements, OTC meds, drugs, alcohol)? --   HCTZ  PNV  20. Any other genetic/environmental exposure to discuss?--no. Infection History:  1.   Lives with someone with TB or TB exposed?--no  2. Patient or partner has history of genital herpes?--no  3. Rash or viral illness since LMP?--no  4. History of STD (GC, CT, HPV, syphilis, HIV)? --no  5. Have you received a flu vaccine for the most recent flu season? -- no  6. Have you or your sexual partner(s) travelled to a [employer] area in the last 3 months? -- no    Past Medical History:   Diagnosis Date    Hypertension      Past Surgical History:   Procedure Laterality Date    HX APPENDECTOMY      HX TONSILLECTOMY       Social History     Occupational History    Not on file   Tobacco Use    Smoking status: Never Smoker    Smokeless tobacco: Never Used   Substance and Sexual Activity    Alcohol use: Not Currently     Frequency: Never    Drug use: Never    Sexual activity: Yes     Partners: Male     Birth control/protection: None     Family History   Problem Relation Age of Onset    Hypertension Mother     Hypertension Father      OB History    Para Term  AB Living   1             SAB TAB Ectopic Molar Multiple Live Births                    # Outcome Date GA Lbr Kan/2nd Weight Sex Delivery Anes PTL Lv   1 Current              Allergies   Allergen Reactions    Codeine Nausea and Vomiting     Prior to Admission medications    Medication Sig Start Date End Date Taking? Authorizing Provider   hydroCHLOROthiazide (HYDRODIURIL) 12.5 mg tablet TAKE 1 TABLET BY MOUTH EVERY DAY IN THE MORNING 10/22/19  Yes Provider, Historical   ALBUTEROL IN Take  by inhalation. Yes Provider, Historical   methyldopa (ALDOMET) 250 mg tablet Take 1 Tab by mouth three (3) times daily.  19  Yes Marina Ryan MD        Review of Systems - History obtained from the patient  Constitutional: negative for weight loss, fever, night sweats  HEENT: negative for hearing loss, earache, congestion, snoring, sorethroat  CV: negative for chest pain, palpitations, edema  Resp: negative for cough, shortness of breath, wheezing  GI: negative for change in bowel habits, abdominal pain, black or bloody stools  : negative for frequency, dysuria, hematuria, vaginal discharge  MSK: negative for back pain, joint pain, muscle pain  Breast: negative for breast lumps, nipple discharge, galactorrhea  Skin :negative for itching, rash, hives  Neuro: negative for dizziness, headache, confusion, weakness  Psych: negative for anxiety, depression, change in mood  Heme/lymph: negative for bleeding, bruising, pallor    Objective:  Visit Vitals  /82   Wt 130 lb (59 kg)   LMP 09/22/2019 (Exact Date)       Physical Exam:   Constitutional  · Appearance: well-nourished, well developed, alert, in no acute distress    HENT  · Head  · Face: appears normal  · Eyes: appear normal  · Ears: normal  · Mouth: normal  · Lips: no lesions    Neck  · Inspection/Palpation: normal appearance, no masses or tenderness  · Lymph Nodes: no lymphadenopathy present  · Thyroid: gland size normal, nontender, no nodules or masses present on palpation    Chest  · Respiratory Effort: breathing unlabored  · Auscultation: normal breath sounds    Cardiovascular  · Heart:  · Auscultation: regular rate and rhythm without murmur    Breasts  · Inspection of Breasts: breasts symmetrical, no skin changes, no discharge present, nipple appearance normal, no skin retraction present  · Palpation of Breasts and Axillae: no masses present on palpation, no breast tenderness  · Axillary Lymph Nodes: no lymphadenopathy present    Gastrointestinal  · Abdominal Examination: abdomen non-tender to palpation, normal bowel sounds, no masses present  · Liver and spleen: no hepatomegaly present, spleen not palpable  · Hernias: no hernias identified    Genitourinary  · External Genitalia: normal appearance for age, no discharge present, no tenderness present, no inflammatory lesions present, no masses present, no atrophy present  · Vagina: normal vaginal vault without central or paravaginal defects, no discharge present, no inflammatory lesions present, no masses present  · Bladder: non-tender to palpation  · Urethra: appears normal  · Cervix: normal appearing with discharge or lesions, os closed  · Uterus: enlarged, normal shape, soft  · Adnexa: no adnexal tenderness present, no adnexal masses present  · Perineum: perineum within normal limits, no evidence of trauma, no rashes or skin lesions present  · Anus: anus within normal limits, no hemorrhoids present  · Inguinal Lymph Nodes: no lymphadenopathy present    Skin  · General Inspection: no rash, no lesions identified    Neurologic/Psychiatric  · Mental Status:  · Orientation: grossly oriented to person, place and time  · Mood and Affect: mood normal, affect appropriate    Assessment:   Irregular cycles  Encounter Diagnoses   Name Primary?  Supervision of other normal pregnancy, antepartum Yes    Essential hypertension affecting pregnancy, antepartum     Encounter for immunization      Due date: LMP    Plan:   We discussed options of genetic screening and diagnostic testing including:  CF testing, CVS, amniocentesis first trimester screening/NT, MSAFP, and NIPT (handout given to patient for review and consent)  She is interested in prenatal genetic testing of her fetus. Plan: NIPS/FS  Disc risk of HCTZ in pregnancy, can continue but prefer changing: aldomet 250mg tid (has h/o low bp with prior meds)  The course of pregnancy discussed including visit schedule, ultrasounds, lab testing, etc.  Pt advised to avoid alcoholic beverages and illicit/recreational drugs use  Recommend taking prenatal vitamins or folic acid daily with DHA/fish oil. The hospital and practice style discussed with coverage system. We discussed nutrition, toxoplasmosis precautions, sexual activity, exercise, need for influenza vaccine, environmental and work hazards, travel advice, screen for domestic violence, need for seat belts.   We discussed seafood, unpasteurized dairy products, deli meat, artificial sweeteners, and caffeine intake. We recommend avoiding chemical and toxin exposures when possible. Information on prenatal and breastfeeding classes given. Information on circumcision given  Patient encouraged not to smoke. Discussed current prescription drug use. Given medication list.  Discussed the use of over the counter medications and chemicals. She is advised to contact her MD with any questions. Pt understands risk of hemorrhage during pregnancy and post delivery and would accept blood products if necessary in life-threatening emergencies  We discussed signs and symptoms of abnormal pregnancies and miscarriage. Handouts given to pt. We discussed the impact of hypertension on pregnancy and the impact of pregnancy on the patient's hypertension. We discussed increased maternal risks of hypertension in the pregnancy, including risks of stroke and heart attack, end-organ disease, as well as her increased risk for developing pre-eclampsia (super-imposed on chronic hypertension) in the pregnancy. We discussed increased risks of hypertension and pregnancy included but were not limited to higher risk for intervention, induction of labor,  delivery, placental abruption, stillbirth and  delivery. We discussed the importance of good blood pressure control, and monitoring for changes in maternal and fetal status during pregnancy and I referred her to [facility] for additional counseling and monitoring. We will do a baseline 24 hour urine collection for creatinine and protein calculation. I recommended taking a daily 81 mg aspirin during pregnancy to potentially decrease risks of developing pre-eclampsia. Change to aldomet    Physician review of ultrasound performed by technician  Today's ultrasound report and images were reviewed and discussed with the patient.   Please see images and imaging report entered by technician in PACS for more detail and progress note and diagnosis entered by MD.    Prince Hanson MD    Orders Placed This Encounter    SD IMMUNIZ ADMIN,1 SINGLE/COMB VAC/TOXOID    CULTURE, URINE    INFLUENZA VIRUS VACCINE QUADRIVALENT, PRESERVATIVE FREE SYRINGE ([id number])    HEP B SURFACE AG    HIV SCREEN, 4TH GEN. W/REFLEX CONFIRM    CBC W/O DIFF    RUBELLA AB, IGG    T PALLIDUM SCREEN W/REFLEX    SMN1 COPY NUMBER ANALYSIS ([facility])    CYSTIC FIBROSIS MUTATION 97    METABOLIC PANEL, COMPREHENSIVE    MISC. LAB TEST    TYPE & SCREEN    hydroCHLOROthiazide (HYDRODIURIL) 12.5 mg tablet    ALBUTEROL IN    methyldopa (ALDOMET) 250 mg tablet    PAP IG,CT-NG, APTIMA HPV AND RFX 16/18,45 ([id number]) ([facility])     Follow-up and Dispositions    · Return in about 4 weeks (around 12/30/2019) for Follow up OB visit.